# Patient Record
Sex: FEMALE | Race: WHITE | NOT HISPANIC OR LATINO
[De-identification: names, ages, dates, MRNs, and addresses within clinical notes are randomized per-mention and may not be internally consistent; named-entity substitution may affect disease eponyms.]

---

## 2017-01-06 ENCOUNTER — APPOINTMENT (OUTPATIENT)
Dept: RHEUMATOLOGY | Facility: CLINIC | Age: 62
End: 2017-01-06

## 2017-01-19 ENCOUNTER — RX RENEWAL (OUTPATIENT)
Age: 62
End: 2017-01-19

## 2017-02-15 ENCOUNTER — APPOINTMENT (OUTPATIENT)
Dept: RHEUMATOLOGY | Facility: CLINIC | Age: 62
End: 2017-02-15

## 2017-02-15 VITALS — HEART RATE: 88 BPM | SYSTOLIC BLOOD PRESSURE: 138 MMHG | DIASTOLIC BLOOD PRESSURE: 78 MMHG | OXYGEN SATURATION: 97 %

## 2017-02-16 LAB
25(OH)D3 SERPL-MCNC: 33.7 NG/ML
ALBUMIN SERPL ELPH-MCNC: 4.9 G/DL
ALP BLD-CCNC: 55 U/L
ALT SERPL-CCNC: 17 U/L
ANION GAP SERPL CALC-SCNC: 14 MMOL/L
AST SERPL-CCNC: 20 U/L
BASOPHILS # BLD AUTO: 0.04 K/UL
BASOPHILS NFR BLD AUTO: 0.5 %
BILIRUB SERPL-MCNC: 0.3 MG/DL
BUN SERPL-MCNC: 22 MG/DL
C3 SERPL-MCNC: 109 MG/DL
C4 SERPL-MCNC: 20 MG/DL
CALCIUM SERPL-MCNC: 9.8 MG/DL
CHLORIDE SERPL-SCNC: 104 MMOL/L
CO2 SERPL-SCNC: 28 MMOL/L
CREAT SERPL-MCNC: 0.68 MG/DL
CREAT SPEC-SCNC: 208 MG/DL
CREAT/PROT UR: 0.1 RATIO
CRP SERPL-MCNC: <0.2 MG/DL
DSDNA AB SER-ACNC: <12 IU/ML
EOSINOPHIL # BLD AUTO: 0.13 K/UL
EOSINOPHIL NFR BLD AUTO: 1.7 %
ERYTHROCYTE [SEDIMENTATION RATE] IN BLOOD BY WESTERGREN METHOD: 10 MM/HR
GLUCOSE SERPL-MCNC: 94 MG/DL
HCT VFR BLD CALC: 42.6 %
HGB BLD-MCNC: 13.7 G/DL
IMM GRANULOCYTES NFR BLD AUTO: 0.3 %
LYMPHOCYTES # BLD AUTO: 2.57 K/UL
LYMPHOCYTES NFR BLD AUTO: 32.7 %
MAN DIFF?: NORMAL
MCHC RBC-ENTMCNC: 29.7 PG
MCHC RBC-ENTMCNC: 32.2 GM/DL
MCV RBC AUTO: 92.4 FL
MONOCYTES # BLD AUTO: 0.63 K/UL
MONOCYTES NFR BLD AUTO: 8 %
NEUTROPHILS # BLD AUTO: 4.46 K/UL
NEUTROPHILS NFR BLD AUTO: 56.8 %
PLATELET # BLD AUTO: 256 K/UL
POTASSIUM SERPL-SCNC: 3.9 MMOL/L
PROT SERPL-MCNC: 7.4 G/DL
PROT UR-MCNC: 16 MG/DL
RBC # BLD: 4.61 M/UL
RBC # FLD: 13.3 %
SODIUM SERPL-SCNC: 146 MMOL/L
WBC # FLD AUTO: 7.85 K/UL

## 2017-02-17 LAB
ANA PAT FLD IF-IMP: ABNORMAL
ANA SER IF-ACNC: ABNORMAL

## 2017-08-18 ENCOUNTER — APPOINTMENT (OUTPATIENT)
Dept: RHEUMATOLOGY | Facility: CLINIC | Age: 62
End: 2017-08-18

## 2017-08-25 ENCOUNTER — APPOINTMENT (OUTPATIENT)
Dept: RHEUMATOLOGY | Facility: CLINIC | Age: 62
End: 2017-08-25
Payer: COMMERCIAL

## 2017-08-25 VITALS
DIASTOLIC BLOOD PRESSURE: 73 MMHG | HEART RATE: 90 BPM | BODY MASS INDEX: 21.44 KG/M2 | WEIGHT: 121 LBS | SYSTOLIC BLOOD PRESSURE: 121 MMHG | HEIGHT: 63 IN

## 2017-08-25 PROCEDURE — 99213 OFFICE O/P EST LOW 20 MIN: CPT

## 2017-08-25 RX ORDER — NITROFURANTOIN (MONOHYDRATE/MACROCRYSTALS) 25; 75 MG/1; MG/1
100 CAPSULE ORAL
Qty: 14 | Refills: 0 | Status: DISCONTINUED | COMMUNITY
Start: 2017-04-24

## 2017-09-08 LAB
25(OH)D3 SERPL-MCNC: 47.1 NG/ML
ALBUMIN SERPL ELPH-MCNC: 4.9 G/DL
ALP BLD-CCNC: 62 U/L
ALT SERPL-CCNC: 16 U/L
ANA PAT FLD IF-IMP: ABNORMAL
ANA SER IF-ACNC: ABNORMAL
ANION GAP SERPL CALC-SCNC: 16 MMOL/L
AST SERPL-CCNC: 18 U/L
BASOPHILS # BLD AUTO: 0.01 K/UL
BASOPHILS NFR BLD AUTO: 0.2 %
BILIRUB SERPL-MCNC: 0.3 MG/DL
BUN SERPL-MCNC: 27 MG/DL
C3 SERPL-MCNC: 124 MG/DL
C4 SERPL-MCNC: 19 MG/DL
CALCIUM SERPL-MCNC: 10.1 MG/DL
CHLORIDE SERPL-SCNC: 103 MMOL/L
CO2 SERPL-SCNC: 25 MMOL/L
CREAT SERPL-MCNC: 0.8 MG/DL
CREAT SPEC-SCNC: 209 MG/DL
CREAT/PROT UR: 0.1 RATIO
CRP SERPL-MCNC: <0.2 MG/DL
DSDNA AB SER-ACNC: <12 IU/ML
EOSINOPHIL # BLD AUTO: 0.07 K/UL
EOSINOPHIL NFR BLD AUTO: 1.5 %
ERYTHROCYTE [SEDIMENTATION RATE] IN BLOOD BY WESTERGREN METHOD: 14 MM/HR
GLUCOSE SERPL-MCNC: 75 MG/DL
HCT VFR BLD CALC: 43 %
HGB BLD-MCNC: 14.2 G/DL
IMM GRANULOCYTES NFR BLD AUTO: 0.2 %
LYMPHOCYTES # BLD AUTO: 1.85 K/UL
LYMPHOCYTES NFR BLD AUTO: 39.2 %
MAN DIFF?: NORMAL
MCHC RBC-ENTMCNC: 29.6 PG
MCHC RBC-ENTMCNC: 33 GM/DL
MCV RBC AUTO: 89.6 FL
MONOCYTES # BLD AUTO: 0.43 K/UL
MONOCYTES NFR BLD AUTO: 9.1 %
NEUTROPHILS # BLD AUTO: 2.35 K/UL
NEUTROPHILS NFR BLD AUTO: 49.8 %
PLATELET # BLD AUTO: 253 K/UL
POTASSIUM SERPL-SCNC: 5 MMOL/L
PROT SERPL-MCNC: 7.7 G/DL
PROT UR-MCNC: 12 MG/DL
RBC # BLD: 4.8 M/UL
RBC # FLD: 14 %
SODIUM SERPL-SCNC: 144 MMOL/L
WBC # FLD AUTO: 4.72 K/UL

## 2018-03-02 ENCOUNTER — APPOINTMENT (OUTPATIENT)
Dept: RHEUMATOLOGY | Facility: CLINIC | Age: 63
End: 2018-03-02
Payer: COMMERCIAL

## 2018-03-02 VITALS
BODY MASS INDEX: 21.62 KG/M2 | OXYGEN SATURATION: 99 % | HEART RATE: 96 BPM | DIASTOLIC BLOOD PRESSURE: 88 MMHG | WEIGHT: 122 LBS | HEIGHT: 63 IN | SYSTOLIC BLOOD PRESSURE: 146 MMHG

## 2018-03-02 PROCEDURE — 99214 OFFICE O/P EST MOD 30 MIN: CPT

## 2018-03-02 RX ORDER — MUPIROCIN 20 MG/G
2 OINTMENT TOPICAL
Qty: 22 | Refills: 0 | Status: ACTIVE | COMMUNITY
Start: 2017-12-28

## 2018-03-19 ENCOUNTER — RX RENEWAL (OUTPATIENT)
Age: 63
End: 2018-03-19

## 2018-03-19 LAB
25(OH)D3 SERPL-MCNC: 35 NG/ML
ALBUMIN SERPL ELPH-MCNC: 4.5 G/DL
ALP BLD-CCNC: 68 U/L
ALT SERPL-CCNC: 20 U/L
ANA SER IF-ACNC: NEGATIVE
ANION GAP SERPL CALC-SCNC: 13 MMOL/L
AST SERPL-CCNC: 26 U/L
BASOPHILS # BLD AUTO: 0.02 K/UL
BASOPHILS NFR BLD AUTO: 0.4 %
BILIRUB SERPL-MCNC: 0.4 MG/DL
BUN SERPL-MCNC: 25 MG/DL
C3 SERPL-MCNC: 123 MG/DL
C4 SERPL-MCNC: 21 MG/DL
CALCIUM SERPL-MCNC: 9.4 MG/DL
CHLORIDE SERPL-SCNC: 103 MMOL/L
CO2 SERPL-SCNC: 26 MMOL/L
CREAT SERPL-MCNC: 0.74 MG/DL
CREAT SPEC-SCNC: 188 MG/DL
CREAT/PROT UR: 0.1 RATIO
CRP SERPL-MCNC: <0.2 MG/DL
DSDNA AB SER-ACNC: <12 IU/ML
EOSINOPHIL # BLD AUTO: 0.13 K/UL
EOSINOPHIL NFR BLD AUTO: 2.3 %
ERYTHROCYTE [SEDIMENTATION RATE] IN BLOOD BY WESTERGREN METHOD: 16 MM/HR
GLUCOSE SERPL-MCNC: 61 MG/DL
HCT VFR BLD CALC: 43.6 %
HGB BLD-MCNC: 14.7 G/DL
IMM GRANULOCYTES NFR BLD AUTO: 0.2 %
LYMPHOCYTES # BLD AUTO: 1.53 K/UL
LYMPHOCYTES NFR BLD AUTO: 27.1 %
MAN DIFF?: NORMAL
MCHC RBC-ENTMCNC: 29.9 PG
MCHC RBC-ENTMCNC: 33.7 GM/DL
MCV RBC AUTO: 88.6 FL
MONOCYTES # BLD AUTO: 0.42 K/UL
MONOCYTES NFR BLD AUTO: 7.4 %
NEUTROPHILS # BLD AUTO: 3.54 K/UL
NEUTROPHILS NFR BLD AUTO: 62.6 %
PLATELET # BLD AUTO: 251 K/UL
POTASSIUM SERPL-SCNC: 4 MMOL/L
PROT SERPL-MCNC: 8.1 G/DL
PROT UR-MCNC: 11 MG/DL
RBC # BLD: 4.92 M/UL
RBC # FLD: 13.3 %
SODIUM SERPL-SCNC: 142 MMOL/L
WBC # FLD AUTO: 5.65 K/UL

## 2018-05-11 ENCOUNTER — APPOINTMENT (OUTPATIENT)
Dept: RHEUMATOLOGY | Facility: CLINIC | Age: 63
End: 2018-05-11
Payer: COMMERCIAL

## 2018-05-11 PROCEDURE — 96374 THER/PROPH/DIAG INJ IV PUSH: CPT

## 2018-09-07 ENCOUNTER — LABORATORY RESULT (OUTPATIENT)
Age: 63
End: 2018-09-07

## 2018-09-07 ENCOUNTER — APPOINTMENT (OUTPATIENT)
Dept: RHEUMATOLOGY | Facility: CLINIC | Age: 63
End: 2018-09-07
Payer: COMMERCIAL

## 2018-09-07 VITALS
HEART RATE: 90 BPM | DIASTOLIC BLOOD PRESSURE: 75 MMHG | OXYGEN SATURATION: 98 % | SYSTOLIC BLOOD PRESSURE: 118 MMHG | WEIGHT: 125 LBS | HEIGHT: 63 IN | BODY MASS INDEX: 22.15 KG/M2

## 2018-09-07 PROCEDURE — 99214 OFFICE O/P EST MOD 30 MIN: CPT

## 2018-09-07 RX ORDER — HYDROCODONE BITARTRATE AND HOMATROPINE METHYLBROMIDE 5; 1.5 MG/5ML; MG/5ML
5-1.5 SYRUP ORAL
Qty: 240 | Refills: 0 | Status: DISCONTINUED | COMMUNITY
Start: 2018-03-02 | End: 2018-09-07

## 2018-09-11 LAB
25(OH)D3 SERPL-MCNC: 51.3 NG/ML
ALBUMIN MFR SERPL ELPH: 61.6 %
ALBUMIN SERPL ELPH-MCNC: 4.6 G/DL
ALBUMIN SERPL-MCNC: 4.6 G/DL
ALBUMIN/GLOB SERPL: 1.6 RATIO
ALP BLD-CCNC: 57 U/L
ALPHA1 GLOB MFR SERPL ELPH: 4 %
ALPHA1 GLOB SERPL ELPH-MCNC: 0.3 G/DL
ALPHA2 GLOB MFR SERPL ELPH: 10.7 %
ALPHA2 GLOB SERPL ELPH-MCNC: 0.8 G/DL
ALT SERPL-CCNC: 17 U/L
ANION GAP SERPL CALC-SCNC: 13 MMOL/L
APPEARANCE: ABNORMAL
AST SERPL-CCNC: 25 U/L
B-GLOBULIN MFR SERPL ELPH: 10.4 %
B-GLOBULIN SERPL ELPH-MCNC: 0.8 G/DL
BASOPHILS # BLD AUTO: 0.03 K/UL
BASOPHILS NFR BLD AUTO: 0.4 %
BILIRUB SERPL-MCNC: 0.3 MG/DL
BILIRUBIN URINE: NEGATIVE
BLOOD URINE: NEGATIVE
BUN SERPL-MCNC: 23 MG/DL
C3 SERPL-MCNC: 123 MG/DL
C4 SERPL-MCNC: 22 MG/DL
CALCIUM SERPL-MCNC: 9.7 MG/DL
CHLORIDE SERPL-SCNC: 103 MMOL/L
CK BB SERPL ELPH-CCNC: 0 % (ref 0–?)
CK MB CFR SERPL ELPH: 0 %
CK MM SERPL ELPH-CCNC: 100 %
CO2 SERPL-SCNC: 27 MMOL/L
COLOR: YELLOW
CREAT SERPL-MCNC: 0.71 MG/DL
CREAT SPEC-SCNC: 168 MG/DL
CREAT/PROT UR: 0.1 RATIO
CREATINE KINASE,TOTAL,SERUM: 196 U/L
CRP SERPL-MCNC: <0.1 MG/DL
DSDNA AB SER-ACNC: <12 IU/ML
EOSINOPHIL # BLD AUTO: 0.09 K/UL
EOSINOPHIL NFR BLD AUTO: 1.3 %
ERYTHROCYTE [SEDIMENTATION RATE] IN BLOOD BY WESTERGREN METHOD: 13 MM/HR
GAMMA GLOB FLD ELPH-MCNC: 1 G/DL
GAMMA GLOB MFR SERPL ELPH: 13.3 %
GLUCOSE QUALITATIVE U: NEGATIVE MG/DL
GLUCOSE SERPL-MCNC: 98 MG/DL
HCT VFR BLD CALC: 41.7 %
HGB BLD-MCNC: 13.3 G/DL
IMM GRANULOCYTES NFR BLD AUTO: 0.3 %
INTERPRETATION SERPL IEP-IMP: NORMAL
KETONES URINE: NEGATIVE
LEUKOCYTE ESTERASE URINE: NEGATIVE
LYMPHOCYTES # BLD AUTO: 1.92 K/UL
LYMPHOCYTES NFR BLD AUTO: 26.7 %
MACRO TYPE 1: 0 %
MACRO TYPE 2: 0 %
MAN DIFF?: NORMAL
MCHC RBC-ENTMCNC: 29.2 PG
MCHC RBC-ENTMCNC: 31.9 GM/DL
MCV RBC AUTO: 91.6 FL
MONOCYTES # BLD AUTO: 0.56 K/UL
MONOCYTES NFR BLD AUTO: 7.8 %
NEUTROPHILS # BLD AUTO: 4.56 K/UL
NEUTROPHILS NFR BLD AUTO: 63.5 %
NITRITE URINE: NEGATIVE
PH URINE: 5.5
PLATELET # BLD AUTO: 265 K/UL
POTASSIUM SERPL-SCNC: 4.3 MMOL/L
PROT SERPL-MCNC: 7.4 G/DL
PROT SERPL-MCNC: 7.5 G/DL
PROT SERPL-MCNC: 7.5 G/DL
PROT UR-MCNC: 14 MG/DL
PROTEIN URINE: NEGATIVE MG/DL
RBC # BLD: 4.55 M/UL
RBC # FLD: 14.4 %
SODIUM SERPL-SCNC: 143 MMOL/L
SPECIFIC GRAVITY URINE: 1.02
TSH SERPL-ACNC: 2.56 UIU/ML
UROBILINOGEN URINE: NEGATIVE MG/DL
WBC # FLD AUTO: 7.18 K/UL

## 2018-09-12 LAB
ANA PAT FLD IF-IMP: ABNORMAL
ANA SER IF-ACNC: ABNORMAL

## 2018-10-11 ENCOUNTER — RX RENEWAL (OUTPATIENT)
Age: 63
End: 2018-10-11

## 2018-11-14 ENCOUNTER — RX RENEWAL (OUTPATIENT)
Age: 63
End: 2018-11-14

## 2018-11-15 ENCOUNTER — RX RENEWAL (OUTPATIENT)
Age: 63
End: 2018-11-15

## 2019-02-19 ENCOUNTER — RX RENEWAL (OUTPATIENT)
Age: 64
End: 2019-02-19

## 2019-03-12 ENCOUNTER — APPOINTMENT (OUTPATIENT)
Dept: RHEUMATOLOGY | Facility: CLINIC | Age: 64
End: 2019-03-12

## 2019-03-15 ENCOUNTER — APPOINTMENT (OUTPATIENT)
Dept: RHEUMATOLOGY | Facility: CLINIC | Age: 64
End: 2019-03-15

## 2019-03-20 ENCOUNTER — RX RENEWAL (OUTPATIENT)
Age: 64
End: 2019-03-20

## 2019-06-07 ENCOUNTER — APPOINTMENT (OUTPATIENT)
Dept: RHEUMATOLOGY | Facility: CLINIC | Age: 64
End: 2019-06-07
Payer: COMMERCIAL

## 2019-06-07 VITALS
BODY MASS INDEX: 21.97 KG/M2 | SYSTOLIC BLOOD PRESSURE: 123 MMHG | WEIGHT: 124 LBS | OXYGEN SATURATION: 96 % | DIASTOLIC BLOOD PRESSURE: 78 MMHG | HEART RATE: 93 BPM | HEIGHT: 63 IN

## 2019-06-07 PROCEDURE — 99214 OFFICE O/P EST MOD 30 MIN: CPT

## 2019-06-07 NOTE — REVIEW OF SYSTEMS
[Fever] : no fever [Feeling Poorly] : not feeling poorly [Chills] : no chills [Feeling Tired] : not feeling tired [Eye Pain] : no eye pain [Dry Eyes] : no dryness of the eyes [Loss Of Hearing] : no hearing loss [Chest Pain] : no chest pain [Palpitations] : no palpitations [Shortness Of Breath] : no shortness of breath [Cough] : no cough [SOB on Exertion] : no shortness of breath during exertion [Abdominal Pain] : no abdominal pain [Dysuria] : no dysuria [Joint Pain] : no joint pain [Arthralgias] : arthralgias [Joint Swelling] : no joint swelling [Joint Stiffness] : no joint stiffness [Skin Lesions] : no skin lesions [Difficulty Walking] : no difficulty walking [Limb Weakness] : no limb weakness [Anxiety] : no anxiety [Muscle Weakness] : no muscle weakness [Depression] : no depression [Easy Bruising] : no tendency for easy bruising

## 2019-06-07 NOTE — HISTORY OF PRESENT ILLNESS
[FreeTextEntry1] : 63 year old female well known to me for many years. She has a diagnosis of SLE, osteopenia and hand OA \par as far as her lupus is concerned she was diagnosed prior to seeing me, she has had minor symptoms mostly with positive serologies and negative APL antibodies. She has been doing well and her SLE has been quiescent for the past few years and I have been able to wean her down on the plaquenil from 500 mg to now off. \par She denies alopecia, oral ulcers, sicca symptoms, or Raynauds. Her joint pains have been stable, she rates it at a 1/10, with some fatigue. She continues to work.\par She has osteopenia and has been reclast q 2 years. \par \par She has a good appetite and denies wt loss. She denies fevers, chills or night sweats. She is uptodate on her age appropriate screens.

## 2019-06-07 NOTE — ASSESSMENT
[FreeTextEntry1] : 63 year old female well known to me for many years. She has a diagnosis of SLE, osteopenia and hand OA presents for routine management today. \par \par \par SLE-\par  she was diagnosed prior to seeing me, she has had minor symptoms mostly with positive serologies and negative APL antibodies. She has been doing well and her SLE has been quiescent for the past few years. She is now off PLQ for past year and doing well. \par Her review of systems is otherwise negative LAbs to be done today. \par \par Muscle spasm/ secondary FMS-\par \par To continue amitrytiline at bed time\par \par Osteopenia-\par she had osteoporosis in the past. She was on reclast and received it 2 years ago at a q 2 year dose. She is to use calcium and vitamin D at 1200 mg qd.\par \par Hand OA-\par She has underlying OA and has had steroid injections in her 5th PIP in the past on the right hand. \par \par Muscle cramps-\par She complains of muscle cramps mostly in her calf muscles, she has had restless leg syndrome in the past and it does not feel like that. Today on examination there is no tenderness or swelling. She states that she drinks enough water. She is to try tizanidine at bedtime. \par \par \par She is aware to call if her symptoms worsen. f/u 6 months. \par

## 2019-06-07 NOTE — PHYSICAL EXAM
[General Appearance - Well Nourished] : well nourished [General Appearance - Alert] : alert [Sclera] : the sclera and conjunctiva were normal [General Appearance - Well Developed] : well developed [Oropharynx] : the oropharynx was normal [Thyroid Diffuse Enlargement] : the thyroid was not enlarged [Neck Appearance] : the appearance of the neck was normal [Auscultation Breath Sounds / Voice Sounds] : lungs were clear to auscultation bilaterally [Respiration, Rhythm And Depth] : normal respiratory rhythm and effort [Heart Sounds] : normal S1 and S2 [Murmurs] : no murmurs [Full Pulse] : the pedal pulses are present [Edema] : there was no peripheral edema [Abdomen Tenderness] : non-tender [Cervical Lymph Nodes Enlarged Anterior Bilaterally] : anterior cervical [Supraclavicular Lymph Nodes Enlarged Bilaterally] : supraclavicular [No CVA Tenderness] : no ~M costovertebral angle tenderness [No Spinal Tenderness] : no spinal tenderness [Abnormal Walk] : normal gait [Nail Clubbing] : no clubbing  or cyanosis of the fingernails [Musculoskeletal - Swelling] : no joint swelling seen [Motor Tone] : muscle strength and tone were normal [] : no rash [FreeTextEntry1] : FROM all joints, no tender points noted, hands with OA changes, with Heberdens nodes  [Deep Tendon Reflexes (DTR)] : deep tendon reflexes were 2+ and symmetric [Oriented To Time, Place, And Person] : oriented to person, place, and time [Motor Exam] : the motor exam was normal [Impaired Insight] : insight and judgment were intact [Affect] : the affect was normal

## 2019-11-13 ENCOUNTER — RX RENEWAL (OUTPATIENT)
Age: 64
End: 2019-11-13

## 2019-11-17 ENCOUNTER — TRANSCRIPTION ENCOUNTER (OUTPATIENT)
Age: 64
End: 2019-11-17

## 2019-12-04 ENCOUNTER — RX RENEWAL (OUTPATIENT)
Age: 64
End: 2019-12-04

## 2019-12-04 LAB
ALBUMIN SERPL ELPH-MCNC: 4 G/DL
ALP BLD-CCNC: 75 U/L
ALT SERPL-CCNC: 13 U/L
ANA SER IF-ACNC: NEGATIVE
ANION GAP SERPL CALC-SCNC: 14 MMOL/L
AST SERPL-CCNC: 15 U/L
BASOPHILS # BLD AUTO: 0.03 K/UL
BASOPHILS NFR BLD AUTO: 0.4 %
BILIRUB SERPL-MCNC: 0.2 MG/DL
BUN SERPL-MCNC: 23 MG/DL
C3 SERPL-MCNC: 161 MG/DL
C4 SERPL-MCNC: 28 MG/DL
CALCIUM SERPL-MCNC: 9.5 MG/DL
CHLORIDE SERPL-SCNC: 106 MMOL/L
CO2 SERPL-SCNC: 26 MMOL/L
CREAT SERPL-MCNC: 0.76 MG/DL
CRP SERPL-MCNC: 4.58 MG/DL
DSDNA AB SER-ACNC: <12 IU/ML
EOSINOPHIL # BLD AUTO: 0.2 K/UL
EOSINOPHIL NFR BLD AUTO: 2.6 %
ERYTHROCYTE [SEDIMENTATION RATE] IN BLOOD BY WESTERGREN METHOD: 50 MM/HR
GLUCOSE SERPL-MCNC: 71 MG/DL
HCT VFR BLD CALC: 38.1 %
HGB BLD-MCNC: 12.1 G/DL
IMM GRANULOCYTES NFR BLD AUTO: 0.3 %
LYMPHOCYTES # BLD AUTO: 1.7 K/UL
LYMPHOCYTES NFR BLD AUTO: 21.7 %
MAN DIFF?: NORMAL
MCHC RBC-ENTMCNC: 30.1 PG
MCHC RBC-ENTMCNC: 31.8 GM/DL
MCV RBC AUTO: 94.8 FL
MONOCYTES # BLD AUTO: 0.73 K/UL
MONOCYTES NFR BLD AUTO: 9.3 %
NEUTROPHILS # BLD AUTO: 5.15 K/UL
NEUTROPHILS NFR BLD AUTO: 65.7 %
PLATELET # BLD AUTO: 246 K/UL
POTASSIUM SERPL-SCNC: 4.1 MMOL/L
PROT SERPL-MCNC: 6.6 G/DL
RBC # BLD: 4.02 M/UL
RBC # FLD: 13.3 %
SODIUM SERPL-SCNC: 146 MMOL/L
WBC # FLD AUTO: 7.83 K/UL

## 2019-12-09 ENCOUNTER — FORM ENCOUNTER (OUTPATIENT)
Age: 64
End: 2019-12-09

## 2019-12-10 ENCOUNTER — OUTPATIENT (OUTPATIENT)
Dept: OUTPATIENT SERVICES | Facility: HOSPITAL | Age: 64
LOS: 1 days | End: 2019-12-10
Payer: COMMERCIAL

## 2019-12-10 ENCOUNTER — APPOINTMENT (OUTPATIENT)
Dept: RADIOLOGY | Facility: CLINIC | Age: 64
End: 2019-12-10
Payer: COMMERCIAL

## 2019-12-10 DIAGNOSIS — M32.9 SYSTEMIC LUPUS ERYTHEMATOSUS, UNSPECIFIED: ICD-10-CM

## 2019-12-10 PROCEDURE — 71046 X-RAY EXAM CHEST 2 VIEWS: CPT | Mod: 26

## 2019-12-10 PROCEDURE — 71046 X-RAY EXAM CHEST 2 VIEWS: CPT

## 2019-12-11 ENCOUNTER — APPOINTMENT (OUTPATIENT)
Dept: RHEUMATOLOGY | Facility: CLINIC | Age: 64
End: 2019-12-11
Payer: COMMERCIAL

## 2019-12-11 VITALS
DIASTOLIC BLOOD PRESSURE: 75 MMHG | BODY MASS INDEX: 22.14 KG/M2 | WEIGHT: 125 LBS | HEART RATE: 97 BPM | SYSTOLIC BLOOD PRESSURE: 130 MMHG | TEMPERATURE: 98.6 F | OXYGEN SATURATION: 97 %

## 2019-12-11 PROCEDURE — 99213 OFFICE O/P EST LOW 20 MIN: CPT

## 2019-12-11 RX ORDER — FLUTICASONE PROPIONATE 50 UG/1
50 SPRAY, METERED NASAL
Qty: 3 | Refills: 3 | Status: ACTIVE | COMMUNITY
Start: 2019-12-11 | End: 1900-01-01

## 2019-12-11 RX ORDER — ONABOTULINUMTOXINA 100 [USP'U]/1
100 INJECTION, POWDER, LYOPHILIZED, FOR SOLUTION INTRADERMAL; INTRAMUSCULAR
Qty: 1 | Refills: 0 | Status: ACTIVE | COMMUNITY
Start: 2019-02-04

## 2019-12-11 NOTE — ASSESSMENT
[FreeTextEntry1] : Patient comes in today for further evaluation of chronic cough for the last 5 weeks. She has not responded to antibiotics, steroid pack, codeine cough syrup. She denies fevers, phlegm, sinus pain, ear pain. I had sent her for a chest x-ray which was normal. Her labs reveal her lupus to be quiet and although her sedimentation rate was mildly elevated.\par \par On exam her lungs are clear to auscultation, her ears are normal on exam, there is no sinus tenderness and there is no postnasal drip.\par \par Discussed with her that this could be chronic GERD symptoms\par \par Plan-\par -to use Flonase\par -2 use Allegra\par -2 use Protonix q.d.\par -If symptoms persist she is aware to notify me after 2 weeks\par -She is asking for a new primary care physician, numbers provided

## 2019-12-11 NOTE — HISTORY OF PRESENT ILLNESS
[FreeTextEntry1] : She comes in acutely. I have spoken to her on several occasions now, she has been dealing with a chronic cough for the last 5 weeks. She was treated with doxycycline initially followed by Medrol Dosepak followed by codeine cough syrup with minimal improvement. I spoke with her I called in prednisone for her and it did not help. She denies GERD symptoms. I sent her for a chest x-ray which was normal her sedimentation rate was mildly elevated.\par She denies phlegm. She denies sinus pain. She denies fullness or pain in ears. She denies fevers or night sweats.

## 2019-12-11 NOTE — PHYSICAL EXAM
[General Appearance - Well Developed] : well developed [General Appearance - Well Nourished] : well nourished [General Appearance - Alert] : alert [Nasal Cavity] : the nasal mucosa and septum were normal [Oropharynx] : the oropharynx was normal [Outer Ear] : the ears and nose were normal in appearance [Auscultation Breath Sounds / Voice Sounds] : lungs were clear to auscultation bilaterally [Respiration, Rhythm And Depth] : normal respiratory rhythm and effort

## 2020-02-25 LAB
25(OH)D3 SERPL-MCNC: 32.4 NG/ML
ALBUMIN SERPL ELPH-MCNC: 4.8 G/DL
ALP BLD-CCNC: 73 U/L
ALT SERPL-CCNC: 15 U/L
ANION GAP SERPL CALC-SCNC: 16 MMOL/L
AST SERPL-CCNC: 21 U/L
BASOPHILS # BLD AUTO: 0.04 K/UL
BASOPHILS NFR BLD AUTO: 0.6 %
BILIRUB SERPL-MCNC: 0.3 MG/DL
BUN SERPL-MCNC: 25 MG/DL
C3 SERPL-MCNC: 133 MG/DL
C4 SERPL-MCNC: 20 MG/DL
CALCIUM SERPL-MCNC: 10.1 MG/DL
CHLORIDE SERPL-SCNC: 105 MMOL/L
CO2 SERPL-SCNC: 22 MMOL/L
CREAT SERPL-MCNC: 0.82 MG/DL
CRP SERPL-MCNC: <0.1 MG/DL
EOSINOPHIL # BLD AUTO: 0.11 K/UL
EOSINOPHIL NFR BLD AUTO: 1.7 %
ERYTHROCYTE [SEDIMENTATION RATE] IN BLOOD BY WESTERGREN METHOD: 22 MM/HR
GLUCOSE SERPL-MCNC: 87 MG/DL
HCT VFR BLD CALC: 43.6 %
HGB BLD-MCNC: 13.6 G/DL
IMM GRANULOCYTES NFR BLD AUTO: 0.3 %
LYMPHOCYTES # BLD AUTO: 1.85 K/UL
LYMPHOCYTES NFR BLD AUTO: 29 %
MAN DIFF?: NORMAL
MCHC RBC-ENTMCNC: 30 PG
MCHC RBC-ENTMCNC: 31.2 GM/DL
MCV RBC AUTO: 96 FL
MONOCYTES # BLD AUTO: 0.41 K/UL
MONOCYTES NFR BLD AUTO: 6.4 %
NEUTROPHILS # BLD AUTO: 3.96 K/UL
NEUTROPHILS NFR BLD AUTO: 62 %
PLATELET # BLD AUTO: 260 K/UL
POTASSIUM SERPL-SCNC: 4.2 MMOL/L
PROT SERPL-MCNC: 7.2 G/DL
RBC # BLD: 4.54 M/UL
RBC # FLD: 13.6 %
SODIUM SERPL-SCNC: 144 MMOL/L
WBC # FLD AUTO: 6.39 K/UL

## 2020-02-26 LAB — DSDNA AB SER-ACNC: <12 IU/ML

## 2020-02-27 LAB
ANA PAT FLD IF-IMP: ABNORMAL
ANA SER IF-ACNC: ABNORMAL

## 2020-03-06 ENCOUNTER — APPOINTMENT (OUTPATIENT)
Dept: RHEUMATOLOGY | Facility: CLINIC | Age: 65
End: 2020-03-06
Payer: COMMERCIAL

## 2020-03-06 VITALS
SYSTOLIC BLOOD PRESSURE: 120 MMHG | DIASTOLIC BLOOD PRESSURE: 80 MMHG | BODY MASS INDEX: 22.5 KG/M2 | HEIGHT: 63 IN | TEMPERATURE: 98.1 F | OXYGEN SATURATION: 97 % | HEART RATE: 83 BPM | WEIGHT: 127 LBS

## 2020-03-06 PROCEDURE — 99214 OFFICE O/P EST MOD 30 MIN: CPT

## 2020-03-06 RX ORDER — METOPROLOL TARTRATE 25 MG/1
25 TABLET, FILM COATED ORAL
Qty: 10 | Refills: 0 | Status: DISCONTINUED | COMMUNITY
Start: 2019-07-18 | End: 2020-03-06

## 2020-03-06 RX ORDER — PREDNISONE 5 MG/1
5 TABLET ORAL
Qty: 40 | Refills: 0 | Status: DISCONTINUED | COMMUNITY
Start: 2019-12-04 | End: 2020-03-06

## 2020-03-06 RX ORDER — METHYLPREDNISOLONE 4 MG/1
4 TABLET ORAL
Qty: 21 | Refills: 0 | Status: DISCONTINUED | COMMUNITY
Start: 2019-11-17 | End: 2020-03-06

## 2020-03-06 RX ORDER — DOXYCYCLINE 100 MG/1
100 CAPSULE ORAL
Qty: 10 | Refills: 0 | Status: DISCONTINUED | COMMUNITY
Start: 2019-11-17 | End: 2020-03-06

## 2020-03-06 RX ORDER — PANTOPRAZOLE 40 MG/1
40 TABLET, DELAYED RELEASE ORAL
Qty: 30 | Refills: 5 | Status: DISCONTINUED | COMMUNITY
Start: 2019-12-11 | End: 2020-03-06

## 2020-03-06 NOTE — ASSESSMENT
[FreeTextEntry1] : 64 year old female well known to me for many years. She has a diagnosis of SLE, osteopenia and hand OA presents for routine management today. \par \par \par SLE-\par  she was diagnosed prior to seeing me, she has had minor symptoms mostly with positive serologies and negative APL antibodies. She has been doing well and her SLE has been quiescent for the past few years. She is now off PLQ for past year and doing well. \par Her review of systems is otherwise negative , recent labs her double-stranded DNA is negative\par \par Muscle spasm/ secondary FMS-\par \par To continue amitrytiline at bed time\par \par Osteopenia-\par she had osteoporosis in the past. She was on reclast and received it 2 years ago at a q 2 year dose. She is to use calcium and vitamin D at 1200 mg qd.Repeat bone density test in May 2020\par \par Hand OA-\par She has underlying OA and has had steroid injections in her 5th PIP in the past on the right hand. \par \par Cough-\par -To return to allergy and get tested\par \par \par She is aware to call if her symptoms worsen. f/u 6 months. \par

## 2020-03-06 NOTE — HISTORY OF PRESENT ILLNESS
[FreeTextEntry1] : 64 year old female well known to me for many years. She has a diagnosis of SLE, osteopenia and hand OA \par as far as her lupus is concerned she was diagnosed prior to seeing me, she has had minor symptoms mostly with positive serologies and negative APL antibodies. She has been doing well and her SLE has been quiescent for the past few years and I have been able to wean her down on the plaquenil from 500 mg to now off. \par She denies alopecia, oral ulcers, sicca symptoms, or Raynauds. Her joint pains have been stable, she rates it at a 1/10, with some fatigue. She continues to work.\par She has osteopenia and has been reclast q 2 years. \par The issue lately has a been a cough and congestion. I saw her in December with it. He tried a PPI, did not help, she has been seeing allergy, she states that she had used high dose steroids which made it better. She is using several inhalers as well as Allegra.\par She has a good appetite and denies wt loss. She denies fevers, chills or night sweats. She is uptodate on her age appropriate screens.

## 2020-03-06 NOTE — PHYSICAL EXAM
[General Appearance - Alert] : alert [General Appearance - Well Nourished] : well nourished [General Appearance - Well Developed] : well developed [Sclera] : the sclera and conjunctiva were normal [Oropharynx] : the oropharynx was normal [Neck Appearance] : the appearance of the neck was normal [Thyroid Diffuse Enlargement] : the thyroid was not enlarged [Respiration, Rhythm And Depth] : normal respiratory rhythm and effort [Auscultation Breath Sounds / Voice Sounds] : lungs were clear to auscultation bilaterally [Heart Sounds] : normal S1 and S2 [Murmurs] : no murmurs [Full Pulse] : the pedal pulses are present [Edema] : there was no peripheral edema [Abdomen Tenderness] : non-tender [Cervical Lymph Nodes Enlarged Anterior Bilaterally] : anterior cervical [Supraclavicular Lymph Nodes Enlarged Bilaterally] : supraclavicular [No CVA Tenderness] : no ~M costovertebral angle tenderness [No Spinal Tenderness] : no spinal tenderness [Abnormal Walk] : normal gait [Nail Clubbing] : no clubbing  or cyanosis of the fingernails [Musculoskeletal - Swelling] : no joint swelling seen [Motor Tone] : muscle strength and tone were normal [] : no rash [Deep Tendon Reflexes (DTR)] : deep tendon reflexes were 2+ and symmetric [Motor Exam] : the motor exam was normal [Oriented To Time, Place, And Person] : oriented to person, place, and time [Impaired Insight] : insight and judgment were intact [Affect] : the affect was normal [FreeTextEntry1] : FROM all joints, no tender points noted, hands with OA changes, with Heberdens nodes

## 2020-03-06 NOTE — REVIEW OF SYSTEMS
[Arthralgias] : arthralgias [Fever] : no fever [Chills] : no chills [Feeling Poorly] : not feeling poorly [Feeling Tired] : not feeling tired [Eye Pain] : no eye pain [Dry Eyes] : no dryness of the eyes [Loss Of Hearing] : no hearing loss [Chest Pain] : no chest pain [Palpitations] : no palpitations [Shortness Of Breath] : no shortness of breath [Cough] : no cough [SOB on Exertion] : no shortness of breath during exertion [Abdominal Pain] : no abdominal pain [Dysuria] : no dysuria [Joint Pain] : no joint pain [Joint Swelling] : no joint swelling [Joint Stiffness] : no joint stiffness [Skin Lesions] : no skin lesions [Limb Weakness] : no limb weakness [Difficulty Walking] : no difficulty walking [Anxiety] : no anxiety [Depression] : no depression [Muscle Weakness] : no muscle weakness [Easy Bruising] : no tendency for easy bruising

## 2020-05-11 ENCOUNTER — TRANSCRIPTION ENCOUNTER (OUTPATIENT)
Age: 65
End: 2020-05-11

## 2020-07-17 ENCOUNTER — APPOINTMENT (OUTPATIENT)
Dept: RHEUMATOLOGY | Facility: CLINIC | Age: 65
End: 2020-07-17
Payer: COMMERCIAL

## 2020-07-17 VITALS
SYSTOLIC BLOOD PRESSURE: 120 MMHG | HEIGHT: 63 IN | WEIGHT: 126 LBS | OXYGEN SATURATION: 98 % | TEMPERATURE: 97.2 F | DIASTOLIC BLOOD PRESSURE: 90 MMHG | HEART RATE: 65 BPM | BODY MASS INDEX: 22.32 KG/M2

## 2020-07-17 DIAGNOSIS — M62.838 OTHER MUSCLE SPASM: ICD-10-CM

## 2020-07-17 PROCEDURE — 99214 OFFICE O/P EST MOD 30 MIN: CPT

## 2020-07-17 RX ORDER — FEXOFENADINE HCL 60 MG/1
60 TABLET, FILM COATED ORAL DAILY
Qty: 30 | Refills: 1 | Status: DISCONTINUED | COMMUNITY
Start: 2019-12-11 | End: 2020-07-17

## 2020-07-17 NOTE — PHYSICAL EXAM
[General Appearance - Alert] : alert [General Appearance - Well Developed] : well developed [Sclera] : the sclera and conjunctiva were normal [General Appearance - Well Nourished] : well nourished [Oropharynx] : the oropharynx was normal [Thyroid Diffuse Enlargement] : the thyroid was not enlarged [Neck Appearance] : the appearance of the neck was normal [Respiration, Rhythm And Depth] : normal respiratory rhythm and effort [Auscultation Breath Sounds / Voice Sounds] : lungs were clear to auscultation bilaterally [Full Pulse] : the pedal pulses are present [Murmurs] : no murmurs [Heart Sounds] : normal S1 and S2 [Abdomen Tenderness] : non-tender [Edema] : there was no peripheral edema [Cervical Lymph Nodes Enlarged Anterior Bilaterally] : anterior cervical [Supraclavicular Lymph Nodes Enlarged Bilaterally] : supraclavicular [No Spinal Tenderness] : no spinal tenderness [No CVA Tenderness] : no ~M costovertebral angle tenderness [Abnormal Walk] : normal gait [Nail Clubbing] : no clubbing  or cyanosis of the fingernails [FreeTextEntry1] : FROM all joints, no tender points noted, hands with OA changes, with Heberdens nodes  [Motor Tone] : muscle strength and tone were normal [Musculoskeletal - Swelling] : no joint swelling seen [Deep Tendon Reflexes (DTR)] : deep tendon reflexes were 2+ and symmetric [Motor Exam] : the motor exam was normal [] : no rash [Oriented To Time, Place, And Person] : oriented to person, place, and time [Impaired Insight] : insight and judgment were intact [Affect] : the affect was normal

## 2020-07-17 NOTE — HISTORY OF PRESENT ILLNESS
[FreeTextEntry1] : 64 year old female well known to me for many years. She has a diagnosis of SLE, osteopenia and hand OA \par as far as her lupus is concerned she was diagnosed prior to seeing me, she has had minor symptoms mostly with positive serologies and negative APL antibodies. She has been doing well and her SLE has been quiescent for the past few years and I have been able to wean her down on the plaquenil from 500 mg to now off. \par She denies alopecia, oral ulcers, sicca symptoms, or Raynauds. Her joint pains have been stable, she rates it at a 1/10, with some fatigue. She continues to work.\par She has osteopenia and has been reclast q 2 years. \par The issue lately has a been a cough and congestion. I saw her in December with it. She tried a PPI, did not help, She is still sx and has seen pul and has a CT chest scheduled although was told it may be upper airway issue. \par She has a good appetite and denies wt loss. She denies fevers, chills or night sweats. She is uptodate on her age appropriate screens.

## 2020-07-17 NOTE — REVIEW OF SYSTEMS
[Chills] : no chills [Fever] : no fever [Eye Pain] : no eye pain [Feeling Poorly] : not feeling poorly [Feeling Tired] : not feeling tired [Loss Of Hearing] : no hearing loss [Dry Eyes] : no dryness of the eyes [Shortness Of Breath] : no shortness of breath [Chest Pain] : no chest pain [Palpitations] : no palpitations [Cough] : no cough [SOB on Exertion] : no shortness of breath during exertion [Arthralgias] : arthralgias [Abdominal Pain] : no abdominal pain [Dysuria] : no dysuria [Joint Swelling] : no joint swelling [Joint Pain] : no joint pain [Joint Stiffness] : no joint stiffness [Limb Weakness] : no limb weakness [Skin Lesions] : no skin lesions [Anxiety] : no anxiety [Difficulty Walking] : no difficulty walking [Depression] : no depression [Muscle Weakness] : no muscle weakness [Easy Bruising] : no tendency for easy bruising

## 2020-07-17 NOTE — ASSESSMENT
[FreeTextEntry1] : 64 year old female well known to me for many years. She has a diagnosis of SLE, osteopenia and hand OA presents for routine management today. \par \par \par SLE-\par  she was diagnosed prior to seeing me, she has had minor symptoms mostly with positive serologies and negative APL antibodies. She has been doing well and her SLE has been quiescent for the past few years. She is now off PLQ for past year and doing well. \par Her review of systems is otherwise negative , recent labs her double-stranded DNA is negative\par To repeat labs\par \par Muscle spasm/ secondary FMS-\par \par To continue amitrytiline at bed time\par \par Osteopenia-\par she had osteoporosis in the past. She was on reclast and received it 2 years ago at a q 2 year dose. She is to use calcium and vitamin D at 1200 mg qd.Repeat bone density test \par \par Hand OA-\par She has underlying OA and has had steroid injections in her 5th PIP in the past on the right hand. \par \par Cough-\par -await Ct chest\par \par \par She is aware to call if her symptoms worsen. f/u 3 months. \par

## 2020-07-23 ENCOUNTER — LABORATORY RESULT (OUTPATIENT)
Age: 65
End: 2020-07-23

## 2020-07-27 LAB
25(OH)D3 SERPL-MCNC: 35.7 NG/ML
ALBUMIN SERPL ELPH-MCNC: 4.9 G/DL
ALP BLD-CCNC: 89 U/L
ALT SERPL-CCNC: 18 U/L
ANA SER IF-ACNC: NEGATIVE
ANION GAP SERPL CALC-SCNC: 12 MMOL/L
APPEARANCE: CLEAR
AST SERPL-CCNC: 19 U/L
BASOPHILS # BLD AUTO: 0.03 K/UL
BASOPHILS NFR BLD AUTO: 0.4 %
BILIRUB SERPL-MCNC: 0.3 MG/DL
BILIRUBIN URINE: NEGATIVE
BLOOD URINE: NEGATIVE
BUN SERPL-MCNC: 23 MG/DL
C3 SERPL-MCNC: 121 MG/DL
C4 SERPL-MCNC: 24 MG/DL
CALCIUM SERPL-MCNC: 9.9 MG/DL
CHLORIDE SERPL-SCNC: 104 MMOL/L
CO2 SERPL-SCNC: 27 MMOL/L
COLOR: NORMAL
CREAT SERPL-MCNC: 0.81 MG/DL
CREAT SPEC-SCNC: 130 MG/DL
CREAT/PROT UR: 0.1 RATIO
CRP SERPL-MCNC: 0.12 MG/DL
DSDNA AB SER-ACNC: <12 IU/ML
EOSINOPHIL # BLD AUTO: 0.08 K/UL
EOSINOPHIL NFR BLD AUTO: 1.2 %
ERYTHROCYTE [SEDIMENTATION RATE] IN BLOOD BY WESTERGREN METHOD: 21 MM/HR
GLUCOSE QUALITATIVE U: NEGATIVE
GLUCOSE SERPL-MCNC: 83 MG/DL
HCT VFR BLD CALC: 45.4 %
HGB BLD-MCNC: 14.4 G/DL
IMM GRANULOCYTES NFR BLD AUTO: 0.3 %
KETONES URINE: NEGATIVE
LEUKOCYTE ESTERASE URINE: NEGATIVE
LYMPHOCYTES # BLD AUTO: 1.76 K/UL
LYMPHOCYTES NFR BLD AUTO: 26.1 %
MAN DIFF?: NORMAL
MCHC RBC-ENTMCNC: 30.6 PG
MCHC RBC-ENTMCNC: 31.7 GM/DL
MCV RBC AUTO: 96.4 FL
MONOCYTES # BLD AUTO: 0.52 K/UL
MONOCYTES NFR BLD AUTO: 7.7 %
MPO AB + PR3 PNL SER: NORMAL
NEUTROPHILS # BLD AUTO: 4.33 K/UL
NEUTROPHILS NFR BLD AUTO: 64.3 %
NITRITE URINE: NEGATIVE
PH URINE: 6
PLATELET # BLD AUTO: 232 K/UL
POTASSIUM SERPL-SCNC: 4.7 MMOL/L
PROT SERPL-MCNC: 7.2 G/DL
PROT UR-MCNC: 8 MG/DL
PROTEIN URINE: NORMAL
RBC # BLD: 4.71 M/UL
RBC # FLD: 14.2 %
SODIUM SERPL-SCNC: 143 MMOL/L
SPECIFIC GRAVITY URINE: 1.02
UROBILINOGEN URINE: NORMAL
WBC # FLD AUTO: 6.74 K/UL

## 2020-07-29 LAB
MYELOPEROXIDASE AB SER QL IA: <5 UNITS
MYELOPEROXIDASE CELLS FLD QL: NEGATIVE
PROTEINASE3 AB SER IA-ACNC: <5 UNITS
PROTEINASE3 AB SER-ACNC: NEGATIVE

## 2020-10-23 ENCOUNTER — APPOINTMENT (OUTPATIENT)
Dept: RHEUMATOLOGY | Facility: CLINIC | Age: 65
End: 2020-10-23

## 2020-10-28 ENCOUNTER — APPOINTMENT (OUTPATIENT)
Dept: RHEUMATOLOGY | Facility: CLINIC | Age: 65
End: 2020-10-28
Payer: COMMERCIAL

## 2020-10-28 VITALS
OXYGEN SATURATION: 97 % | WEIGHT: 127 LBS | SYSTOLIC BLOOD PRESSURE: 128 MMHG | HEART RATE: 67 BPM | DIASTOLIC BLOOD PRESSURE: 72 MMHG | TEMPERATURE: 98.1 F | BODY MASS INDEX: 22.5 KG/M2

## 2020-10-28 DIAGNOSIS — M85.80 OTHER SPECIFIED DISORDERS OF BONE DENSITY AND STRUCTURE, UNSPECIFIED SITE: ICD-10-CM

## 2020-10-28 PROCEDURE — 99214 OFFICE O/P EST MOD 30 MIN: CPT | Mod: 25

## 2020-10-28 PROCEDURE — 99072 ADDL SUPL MATRL&STAF TM PHE: CPT

## 2020-10-28 NOTE — HISTORY OF PRESENT ILLNESS
[FreeTextEntry1] : f/u visit\par 65 year old female well known to me for many years. She has a diagnosis of SLE, osteopenia and hand OA \par as far as her lupus is concerned she was diagnosed prior to seeing me, she has had minor symptoms mostly with positive serologies and negative APL antibodies. She has been doing well and her SLE has been quiescent for the past few years and I have been able to wean her down on the plaquenil from 500 mg to now off. She has done  very well without PLq.\par She denies alopecia, oral ulcers, sicca symptoms, or Raynauds. Her joint pains have been stable, she rates it at a 1/10, with some fatigue. She continues to work.\par She has osteopenia and has been reclast q 2 years. \par  she had a CT chest that as per pt showed spots of mucus in her lungs. she was given a ? incentive spirometer by pul, states that she had no change for 2 months. She also saw allergy, and was treated with abx and is better now\par She has a good appetite and denies wt loss. She denies fevers, chills or night sweats. She is uptodate on her age appropriate screens.

## 2021-01-21 ENCOUNTER — OUTPATIENT (OUTPATIENT)
Dept: OUTPATIENT SERVICES | Facility: HOSPITAL | Age: 66
LOS: 1 days | End: 2021-01-21
Payer: COMMERCIAL

## 2021-01-21 ENCOUNTER — APPOINTMENT (OUTPATIENT)
Dept: CT IMAGING | Facility: CLINIC | Age: 66
End: 2021-01-21
Payer: COMMERCIAL

## 2021-01-21 ENCOUNTER — LABORATORY RESULT (OUTPATIENT)
Age: 66
End: 2021-01-21

## 2021-01-21 DIAGNOSIS — R05 COUGH: ICD-10-CM

## 2021-01-21 PROCEDURE — 71250 CT THORAX DX C-: CPT

## 2021-01-21 PROCEDURE — 71250 CT THORAX DX C-: CPT | Mod: 26

## 2021-01-25 ENCOUNTER — NON-APPOINTMENT (OUTPATIENT)
Age: 66
End: 2021-01-25

## 2021-01-25 LAB
25(OH)D3 SERPL-MCNC: 32 NG/ML
ALBUMIN SERPL ELPH-MCNC: 4.7 G/DL
ALP BLD-CCNC: 85 U/L
ALT SERPL-CCNC: 17 U/L
ANA SER IF-ACNC: NEGATIVE
ANION GAP SERPL CALC-SCNC: 13 MMOL/L
AST SERPL-CCNC: 18 U/L
BASOPHILS # BLD AUTO: 0.02 K/UL
BASOPHILS NFR BLD AUTO: 0.4 %
BILIRUB SERPL-MCNC: 0.3 MG/DL
BUN SERPL-MCNC: 20 MG/DL
C3 SERPL-MCNC: 116 MG/DL
C4 SERPL-MCNC: 24 MG/DL
CALCIUM SERPL-MCNC: 9.7 MG/DL
CENTROMERE IGG SER-ACNC: <0.2 CD:130001892
CHLORIDE SERPL-SCNC: 106 MMOL/L
CO2 SERPL-SCNC: 26 MMOL/L
CREAT SERPL-MCNC: 0.85 MG/DL
CREAT SPEC-SCNC: 131 MG/DL
CREAT/PROT UR: 0.1 RATIO
CRP SERPL-MCNC: 0.15 MG/DL
DSDNA AB SER-ACNC: <12 IU/ML
ENA SS-A AB SER IA-ACNC: 0.6 AL
ENA SS-B AB SER IA-ACNC: 0.5 AL
EOSINOPHIL # BLD AUTO: 0.09 K/UL
EOSINOPHIL NFR BLD AUTO: 1.7 %
ERYTHROCYTE [SEDIMENTATION RATE] IN BLOOD BY WESTERGREN METHOD: 13 MM/HR
GLUCOSE SERPL-MCNC: 74 MG/DL
HCT VFR BLD CALC: 43.2 %
HGB BLD-MCNC: 13.8 G/DL
IMM GRANULOCYTES NFR BLD AUTO: 0.2 %
LYMPHOCYTES # BLD AUTO: 1.42 K/UL
LYMPHOCYTES NFR BLD AUTO: 26.4 %
MAN DIFF?: NORMAL
MCHC RBC-ENTMCNC: 30.5 PG
MCHC RBC-ENTMCNC: 31.9 GM/DL
MCV RBC AUTO: 95.4 FL
MONOCYTES # BLD AUTO: 0.51 K/UL
MONOCYTES NFR BLD AUTO: 9.5 %
MPO AB + PR3 PNL SER: NORMAL
NEUTROPHILS # BLD AUTO: 3.33 K/UL
NEUTROPHILS NFR BLD AUTO: 61.8 %
PLATELET # BLD AUTO: 232 K/UL
POTASSIUM SERPL-SCNC: 4.1 MMOL/L
PROT SERPL-MCNC: 7.1 G/DL
PROT UR-MCNC: 9 MG/DL
RBC # BLD: 4.53 M/UL
RBC # FLD: 13.3 %
SODIUM SERPL-SCNC: 144 MMOL/L
WBC # FLD AUTO: 5.38 K/UL

## 2021-01-29 ENCOUNTER — APPOINTMENT (OUTPATIENT)
Dept: RHEUMATOLOGY | Facility: CLINIC | Age: 66
End: 2021-01-29

## 2021-01-29 ENCOUNTER — TRANSCRIPTION ENCOUNTER (OUTPATIENT)
Age: 66
End: 2021-01-29

## 2021-02-05 ENCOUNTER — APPOINTMENT (OUTPATIENT)
Dept: INTERNAL MEDICINE | Facility: CLINIC | Age: 66
End: 2021-02-05
Payer: COMMERCIAL

## 2021-02-05 ENCOUNTER — NON-APPOINTMENT (OUTPATIENT)
Age: 66
End: 2021-02-05

## 2021-02-05 VITALS
OXYGEN SATURATION: 96 % | BODY MASS INDEX: 22.32 KG/M2 | WEIGHT: 126 LBS | SYSTOLIC BLOOD PRESSURE: 142 MMHG | RESPIRATION RATE: 16 BRPM | TEMPERATURE: 98.1 F | DIASTOLIC BLOOD PRESSURE: 86 MMHG | HEART RATE: 87 BPM | HEIGHT: 63 IN

## 2021-02-05 DIAGNOSIS — J47.9 BRONCHIECTASIS, UNCOMPLICATED: ICD-10-CM

## 2021-02-05 DIAGNOSIS — Z87.891 PERSONAL HISTORY OF NICOTINE DEPENDENCE: ICD-10-CM

## 2021-02-05 DIAGNOSIS — I10 ESSENTIAL (PRIMARY) HYPERTENSION: ICD-10-CM

## 2021-02-05 PROCEDURE — 99072 ADDL SUPL MATRL&STAF TM PHE: CPT

## 2021-02-05 PROCEDURE — 99205 OFFICE O/P NEW HI 60 MIN: CPT

## 2021-02-05 NOTE — REVIEW OF SYSTEMS
[Cough] : cough [Joint Stiffness] : joint stiffness [Joint Swelling] : joint swelling [Negative] : Heme/Lymph

## 2021-02-05 NOTE — HISTORY OF PRESENT ILLNESS
[FreeTextEntry1] : The patient comes in today for a new patient pulmonary evaluation.\par  [de-identified] : The patient is a 65-year-old white female with a history of lupus, hypertension, and irritable bowel syndrome who presents for initial evaluation of a chronic cough.\par \par The patient states that she initially noted to have a significant leukopenia in her 20s. She was seen by hematologist. A workup was negative. She was told to just monitor her blood counts. She then developed Raynaud's syndrome and joint swelling with arthralgias several years down the road. Eventually she was diagnosed with lupus approximately 15 years ago. She was initially treated with Plaquenil and prednisone for 10 years with good results. She discontinued these medications 5 years ago, because her symptoms were stable and improved.\par \par The patient noted the onset of a cough approximately 15 months ago. This was approximately November of 2019. She denied any viral-type prodrome. The cough is primarily nonproductive, but she was having moderate paroxysms. She was seen by her primary care physician in treated with a Z-Juan without any improvement. She was then given a treatment with Bactrim again with no help. She does have postnasal drip. She was seen by an allergist, who performed spirometry which apparently was slightly abnormal. She was told however that she did not have asthma. She was placed on metered dose inhaler possibly albuterol in addition to Flonase. She did not have much of a response. She was then given a one-week course of prednisone which she (mistakenly) to 80 mg a day for one week as well as doxycycline and another Z-Juan. The cough actually resolved after that regimen. She then eventually discontinued the metered dose inhaler and Flonase as well.\par \par The cough again returned several months ago. She was seen by another pulmonologist who ordered a CAT scan of the chest. The study was remarkable for mild bronchiectatic changes with tree in bud infiltrates in the lingula. There were multiple nodular opacities as well as several calcified nodules. The nodules were unchanged dating back to a CAT scan that had previously been done in December of 2011. She was placed on an [employer] device to help improve mucus evacuation. She had no improvement in the cough persisted. She was then seen by a rheumatologist, Dr. Sinha, and a repeat CAT scan of the chest was performed. The study revealed mild bronchiectasis again in a similar location as well as slight tree in bud changes and a 4 mm nodule in the lingula. She was subsequently referred to this office for further evaluation. She now comes in for this assessment.

## 2021-02-05 NOTE — DATA REVIEWED
[FreeTextEntry1] : CAT scans of the chest were extensively reviewed from both July 2020, and January 2021.

## 2021-02-05 NOTE — PHYSICAL EXAM
[No Acute Distress] : no acute distress [Well Nourished] : well nourished [Well Developed] : well developed [Well-Appearing] : well-appearing [Normal Sclera/Conjunctiva] : normal sclera/conjunctiva [EOMI] : extraocular movements intact [Normal Outer Ear/Nose] : the outer ears and nose were normal in appearance [Normal Oropharynx] : the oropharynx was normal [No JVD] : no jugular venous distention [Supple] : supple [No Respiratory Distress] : no respiratory distress  [No Accessory Muscle Use] : no accessory muscle use [Clear to Auscultation] : lungs were clear to auscultation bilaterally [Normal Rate] : normal rate  [Regular Rhythm] : with a regular rhythm [Normal S1, S2] : normal S1 and S2 [No Murmur] : no murmur heard [No Edema] : there was no peripheral edema [No Extremity Clubbing/Cyanosis] : no extremity clubbing/cyanosis [Soft] : abdomen soft [Non Tender] : non-tender [Non-distended] : non-distended [No Masses] : no abdominal mass palpated [No HSM] : no HSM [Normal Bowel Sounds] : normal bowel sounds [Normal Supraclavicular Nodes] : no supraclavicular lymphadenopathy [Normal Posterior Cervical Nodes] : no posterior cervical lymphadenopathy [Normal Anterior Cervical Nodes] : no anterior cervical lymphadenopathy [No CVA Tenderness] : no CVA  tenderness [No Rash] : no rash [Coordination Grossly Intact] : coordination grossly intact [No Focal Deficits] : no focal deficits [Normal Gait] : normal gait [Deep Tendon Reflexes (DTR)] : deep tendon reflexes were 2+ and symmetric [Normal Affect] : the affect was normal [Normal Insight/Judgement] : insight and judgment were intact [de-identified] : A. minimal expiratory wheezes noted with forced maneuvers only.

## 2021-02-12 DIAGNOSIS — Z20.828 CONTACT WITH AND (SUSPECTED) EXPOSURE TO OTHER VIRAL COMMUNICABLE DISEASES: ICD-10-CM

## 2021-02-14 ENCOUNTER — APPOINTMENT (OUTPATIENT)
Dept: DISASTER EMERGENCY | Facility: CLINIC | Age: 66
End: 2021-02-14

## 2021-02-14 DIAGNOSIS — Z01.818 ENCOUNTER FOR OTHER PREPROCEDURAL EXAMINATION: ICD-10-CM

## 2021-02-15 LAB — SARS-COV-2 N GENE NPH QL NAA+PROBE: NOT DETECTED

## 2021-02-17 ENCOUNTER — APPOINTMENT (OUTPATIENT)
Dept: INTERNAL MEDICINE | Facility: CLINIC | Age: 66
End: 2021-02-17
Payer: COMMERCIAL

## 2021-02-17 ENCOUNTER — APPOINTMENT (OUTPATIENT)
Dept: INTERNAL MEDICINE | Facility: CLINIC | Age: 66
End: 2021-02-17

## 2021-02-17 VITALS
TEMPERATURE: 98.1 F | DIASTOLIC BLOOD PRESSURE: 80 MMHG | WEIGHT: 129 LBS | RESPIRATION RATE: 18 BRPM | SYSTOLIC BLOOD PRESSURE: 120 MMHG | HEART RATE: 99 BPM | BODY MASS INDEX: 22.86 KG/M2 | OXYGEN SATURATION: 97 % | HEIGHT: 63 IN

## 2021-02-17 DIAGNOSIS — J98.4 OTHER DISORDERS OF LUNG: ICD-10-CM

## 2021-02-17 PROCEDURE — 99072 ADDL SUPL MATRL&STAF TM PHE: CPT

## 2021-02-17 PROCEDURE — 94010 BREATHING CAPACITY TEST: CPT

## 2021-02-17 PROCEDURE — 94727 GAS DIL/WSHOT DETER LNG VOL: CPT

## 2021-02-17 PROCEDURE — 94729 DIFFUSING CAPACITY: CPT

## 2021-02-18 PROBLEM — J98.4 RESTRICTIVE LUNG DISEASE: Status: ACTIVE | Noted: 2021-02-18

## 2021-02-19 ENCOUNTER — NON-APPOINTMENT (OUTPATIENT)
Age: 66
End: 2021-02-19

## 2021-02-19 RX ORDER — BECLOMETHASONE DIPROPIONATE HFA 80 UG/1
80 AEROSOL, METERED RESPIRATORY (INHALATION) TWICE DAILY
Qty: 1 | Refills: 11 | Status: ACTIVE | COMMUNITY
Start: 2021-02-19 | End: 1900-01-01

## 2021-04-26 ENCOUNTER — APPOINTMENT (OUTPATIENT)
Dept: INTERNAL MEDICINE | Facility: CLINIC | Age: 66
End: 2021-04-26

## 2021-09-08 ENCOUNTER — TRANSCRIPTION ENCOUNTER (OUTPATIENT)
Age: 66
End: 2021-09-08

## 2021-10-01 ENCOUNTER — APPOINTMENT (OUTPATIENT)
Dept: RHEUMATOLOGY | Facility: CLINIC | Age: 66
End: 2021-10-01
Payer: COMMERCIAL

## 2021-10-01 ENCOUNTER — LABORATORY RESULT (OUTPATIENT)
Age: 66
End: 2021-10-01

## 2021-10-01 VITALS
HEART RATE: 78 BPM | OXYGEN SATURATION: 98 % | WEIGHT: 127 LBS | DIASTOLIC BLOOD PRESSURE: 68 MMHG | BODY MASS INDEX: 22.5 KG/M2 | TEMPERATURE: 97.7 F | SYSTOLIC BLOOD PRESSURE: 110 MMHG

## 2021-10-01 DIAGNOSIS — J84.10 PULMONARY FIBROSIS, UNSPECIFIED: ICD-10-CM

## 2021-10-01 DIAGNOSIS — J44.9 CHRONIC OBSTRUCTIVE PULMONARY DISEASE, UNSPECIFIED: ICD-10-CM

## 2021-10-01 LAB
25(OH)D3 SERPL-MCNC: 32.9 NG/ML
ALBUMIN SERPL ELPH-MCNC: 4.6 G/DL
ALP BLD-CCNC: 93 U/L
ALT SERPL-CCNC: 30 U/L
ANION GAP SERPL CALC-SCNC: 11 MMOL/L
AST SERPL-CCNC: 34 U/L
BASOPHILS # BLD AUTO: 0.03 K/UL
BASOPHILS NFR BLD AUTO: 0.5 %
BILIRUB SERPL-MCNC: 0.4 MG/DL
BUN SERPL-MCNC: 20 MG/DL
CALCIUM SERPL-MCNC: 9.2 MG/DL
CALCIUM SERPL-MCNC: 9.2 MG/DL
CHLORIDE SERPL-SCNC: 105 MMOL/L
CO2 SERPL-SCNC: 27 MMOL/L
CREAT SERPL-MCNC: 0.7 MG/DL
CRP SERPL-MCNC: 5 MG/L
EOSINOPHIL # BLD AUTO: 0.11 K/UL
EOSINOPHIL NFR BLD AUTO: 1.8 %
ERYTHROCYTE [SEDIMENTATION RATE] IN BLOOD BY WESTERGREN METHOD: 42 MM/HR
GLUCOSE SERPL-MCNC: 105 MG/DL
HCT VFR BLD CALC: 42.9 %
HGB BLD-MCNC: 13.8 G/DL
IMM GRANULOCYTES NFR BLD AUTO: 0.3 %
LYMPHOCYTES # BLD AUTO: 1.25 K/UL
LYMPHOCYTES NFR BLD AUTO: 20.4 %
MAN DIFF?: NORMAL
MCHC RBC-ENTMCNC: 30.8 PG
MCHC RBC-ENTMCNC: 32.2 GM/DL
MCV RBC AUTO: 95.8 FL
MONOCYTES # BLD AUTO: 0.62 K/UL
MONOCYTES NFR BLD AUTO: 10.1 %
NEUTROPHILS # BLD AUTO: 4.09 K/UL
NEUTROPHILS NFR BLD AUTO: 66.9 %
PARATHYROID HORMONE INTACT: 27 PG/ML
PLATELET # BLD AUTO: 244 K/UL
POTASSIUM SERPL-SCNC: 3.9 MMOL/L
PROT SERPL-MCNC: 7 G/DL
RBC # BLD: 4.48 M/UL
RBC # FLD: 13.1 %
SODIUM SERPL-SCNC: 142 MMOL/L
TSH SERPL-ACNC: 1.88 UIU/ML
WBC # FLD AUTO: 6.12 K/UL

## 2021-10-01 PROCEDURE — 99214 OFFICE O/P EST MOD 30 MIN: CPT

## 2021-10-01 RX ORDER — ROSUVASTATIN CALCIUM 10 MG/1
10 TABLET, FILM COATED ORAL
Refills: 0 | Status: ACTIVE | COMMUNITY

## 2021-10-01 RX ORDER — ZOLEDRONIC ACID 5 MG/100ML
5 INJECTION INTRAVENOUS
Refills: 0 | Status: DISCONTINUED | COMMUNITY
Start: 2018-05-04 | End: 2021-10-01

## 2021-10-01 RX ORDER — ASPIRIN 81 MG
81 TABLET, DELAYED RELEASE (ENTERIC COATED) ORAL
Refills: 0 | Status: ACTIVE | COMMUNITY

## 2021-10-01 RX ORDER — METOPROLOL TARTRATE 75 MG/1
TABLET, FILM COATED ORAL
Qty: 90 | Refills: 0 | Status: ACTIVE | COMMUNITY

## 2021-10-01 RX ORDER — TIZANIDINE 2 MG/1
2 TABLET ORAL
Qty: 60 | Refills: 5 | Status: DISCONTINUED | COMMUNITY
Start: 2018-09-07 | End: 2021-10-01

## 2021-10-01 RX ORDER — CLOPIDOGREL 75 MG/1
75 TABLET, FILM COATED ORAL
Refills: 0 | Status: ACTIVE | COMMUNITY

## 2021-10-02 ENCOUNTER — LABORATORY RESULT (OUTPATIENT)
Age: 66
End: 2021-10-02

## 2021-10-02 LAB
APPEARANCE: CLEAR
BILIRUBIN URINE: NEGATIVE
BLOOD URINE: NEGATIVE
C3 SERPL-MCNC: 135 MG/DL
C4 SERPL-MCNC: 26 MG/DL
COLOR: YELLOW
CREAT SPEC-SCNC: 168 MG/DL
CREAT/PROT UR: 0.1 RATIO
DSDNA AB SER-ACNC: <12 IU/ML
GLUCOSE QUALITATIVE U: NEGATIVE
KETONES URINE: NEGATIVE
LEUKOCYTE ESTERASE URINE: ABNORMAL
NITRITE URINE: NEGATIVE
PH URINE: 5.5
PROT UR-MCNC: 14 MG/DL
PROTEIN URINE: NORMAL
SPECIFIC GRAVITY URINE: 1.03
UROBILINOGEN URINE: NORMAL

## 2021-10-02 NOTE — HISTORY OF PRESENT ILLNESS
[FreeTextEntry1] : 10/1/21 \par FIRST VISIT WITH ME.. Adrian from Dr Sinha\par - Dx with SLE ys ago presented with + serologies and diffuse arthralgias/ fatigue.  In past on high dose HCQ 50 mg, tapered off over past few ys without return of sx \par - Had been experiencing WILKINS for > 1 yr, eventually dx with advanced CAD and required 4 stents.. now doing well.. no hx of CHF.. WILKINS / fatigue fully resolved.  Still adjusting meds for ideal BP recently lowered Amlodipine/ and metoprolol to lowest doses no longer feeling dizzy\par - OP:  qoy Reclast, not tolerated well severe bony pain... doesn't want to continue \par - overall feeling very well, off all DMARds for several years now.. \par - OA hand with CMC squaring and diffuse DIP proliferative changes \par - continues\par \par 1) SLE\par 2) CAD\par 3) OP\par 4) OA primarily hands \par 5) FM mild \par \par ____________________________________________________________________________\par f/u visit\par 65 year old female well known to me for many years. She has a diagnosis of SLE, osteopenia and hand OA \par as far as her lupus is concerned she was diagnosed prior to seeing me, she has had minor symptoms mostly with positive serologies and negative APL antibodies. She has been doing well and her SLE has been quiescent for the past few years and I have been able to wean her down on the plaquenil from 500 mg to now off. She has done  very well without PLq.\par She denies alopecia, oral ulcers, sicca symptoms, or Raynauds. Her joint pains have been stable, she rates it at a 1/10, with some fatigue. She continues to work.\par She has osteopenia and has been reclast q 2 years. \par  she had a CT chest that as per pt showed spots of mucus in her lungs. she was given a ? incentive spirometer by pul, states that she had no change for 2 months. She also saw allergy, and was treated with abx and is better now\par She has a good appetite and denies wt loss. She denies fevers, chills or night sweats. She is uptodate on her age appropriate screens.

## 2021-10-02 NOTE — PHYSICAL EXAM
[General Appearance - Alert] : alert [General Appearance - Well Nourished] : well nourished [General Appearance - Well Developed] : well developed [Sclera] : the sclera and conjunctiva were normal [Oropharynx] : the oropharynx was normal [Neck Appearance] : the appearance of the neck was normal [Thyroid Diffuse Enlargement] : the thyroid was not enlarged [Respiration, Rhythm And Depth] : normal respiratory rhythm and effort [Auscultation Breath Sounds / Voice Sounds] : lungs were clear to auscultation bilaterally [Heart Sounds] : normal S1 and S2 [Murmurs] : no murmurs [Edema] : there was no peripheral edema [Abdomen Tenderness] : non-tender [Cervical Lymph Nodes Enlarged Anterior Bilaterally] : anterior cervical [Supraclavicular Lymph Nodes Enlarged Bilaterally] : supraclavicular [No CVA Tenderness] : no ~M costovertebral angle tenderness [No Spinal Tenderness] : no spinal tenderness [Abnormal Walk] : normal gait [Nail Clubbing] : no clubbing  or cyanosis of the fingernails [Musculoskeletal - Swelling] : no joint swelling seen [Motor Tone] : muscle strength and tone were normal [] : no rash [Motor Exam] : the motor exam was normal [Oriented To Time, Place, And Person] : oriented to person, place, and time [Impaired Insight] : insight and judgment were intact [Affect] : the affect was normal [General Appearance - In No Acute Distress] : in no acute distress [General Appearance - Well-Appearing] : healthy appearing [Extraocular Movements] : extraocular movements were intact [PERRL With Normal Accommodation] : pupils were equal in size, round, and reactive to light [Outer Ear] : the ears and nose were normal in appearance [Nasal Cavity] : the nasal mucosa and septum were normal [Neck Cervical Mass (___cm)] : no neck mass was observed [Jugular Venous Distention Increased] : there was no jugular-venous distention [Thyroid Nodule] : there were no palpable thyroid nodules [Heart Rate And Rhythm] : heart rate was normal and rhythm regular [Heart Sounds Gallop] : no gallops [Heart Sounds Pericardial Friction Rub] : no pericardial rub [Bowel Sounds] : normal bowel sounds [Abdomen Soft] : soft [Abdomen Mass (___ Cm)] : no abdominal mass palpated [Cervical Lymph Nodes Enlarged Posterior Bilaterally] : posterior cervical [FreeTextEntry1] : FROM all joints, no tender points noted, hands with OA changes, with Heberdens nodes and CMC squaring- most uncomfortable today at R 5 PIP.. bony change and mild ttt  [Skin Color & Pigmentation] : normal skin color and pigmentation [Skin Turgor] : normal skin turgor

## 2021-10-02 NOTE — REVIEW OF SYSTEMS
[Arthralgias] : arthralgias [Fever] : no fever [Chills] : no chills [Feeling Poorly] : not feeling poorly [Feeling Tired] : not feeling tired [Eye Pain] : no eye pain [Dry Eyes] : no dryness of the eyes [Loss Of Hearing] : no hearing loss [Chest Pain] : no chest pain [Palpitations] : no palpitations [Shortness Of Breath] : no shortness of breath [Cough] : no cough [SOB on Exertion] : shortness of breath during exertion [As Noted in HPI] : as noted in HPI [Abdominal Pain] : no abdominal pain [Heartburn] : heartburn [Dysuria] : no dysuria [Joint Pain] : no joint pain [Joint Swelling] : no joint swelling [Joint Stiffness] : no joint stiffness [Skin Lesions] : no skin lesions [Limb Weakness] : no limb weakness [Difficulty Walking] : no difficulty walking [Sleep Disturbances] : sleep disturbances [Anxiety] : no anxiety [Muscle Weakness] : no muscle weakness [Depression] : no depression [Easy Bruising] : no tendency for easy bruising [Negative] : Heme/Lymph [FreeTextEntry7] : PRN use of Pepcid [de-identified] : controlled w/ amitrip [FreeTextEntry9] : nothing recent except hands - mild CMC and R 5 PIP

## 2021-10-02 NOTE — ASSESSMENT
[FreeTextEntry1] : 65 year old female She has a diagnosis of SLE, osteopenia and hand OA presents for routine management today. \par R eye spasticity + response to BOtox routinely \par \par \par 1) SLE- PAMELLA 1:160S serologies neg past few ys (? full profile unknown).. w/ nl C3/4 and no proteinuria even off tx.  Minimal systemic c/o now or in past few ys.. tapered off high of 500 mg HCQ completely off 2019... no return of sx.  \par Previous WILKINS w/u + CAD (see below) \par Her review of systems continues to be negative \par \par 2) Muscle spasm/ secondary FMS-PRN use Tizanidine when severe, nothing recent but c/w amitrip 10-20 mg with + response \par \par 3) Osteoporosis:  Last DExa 7/23/20 w/ most severe T score -2.7 at L1 and -2.4 at LFN w/ frax 11/ 2.4%... and essentially stable over past 4 ys on QOY Reclast, but hates bony pain experienced for several wks after each  She is to use calcium and vitamin D at 1200 mg qd. \par Given low frax have held tx since then.  Will get updated study next year.. \par \par 4) Hand OA-\par She has underlying OA and has had steroid injections in her 5th PIP in the past on the right hand and b/l CMC.. not severe at this time. \par \par 5) CAD:  initially presented with SOB/ WILKINS.. dramatic improvement with 5 stents.. again recommend possible low dose HCQ to prevent low grade inflammation associated w/ SLE but at this time little evidence of SLE .. . \par \par 6) Chronic intermittent Cough- non productive and not associated w/ SOB..\par -Ct chest reviewed, ANCAs negative with allergy, follows closely with Dr. Chen.  No tx needed at this time.  \par \par Plan: \par - no change in tx at this point ... could consider 200 mg qod HCQ for CV protection, but doesn't sound like SLE was ever severe- ? not sure why 500 mg HCQ used??\par \par - She is aware to call if her symptoms worsen. f/u 4 months. \par

## 2021-10-06 PROBLEM — I10 ESSENTIAL HYPERTENSION: Status: ACTIVE | Noted: 2021-02-05

## 2021-10-06 PROBLEM — J44.9 OBSTRUCTIVE LUNG DISEASE: Status: ACTIVE | Noted: 2021-02-18

## 2021-10-15 ENCOUNTER — APPOINTMENT (OUTPATIENT)
Dept: INTERNAL MEDICINE | Facility: CLINIC | Age: 66
End: 2021-10-15

## 2022-03-24 ENCOUNTER — LABORATORY RESULT (OUTPATIENT)
Age: 67
End: 2022-03-24

## 2022-04-01 ENCOUNTER — LABORATORY RESULT (OUTPATIENT)
Age: 67
End: 2022-04-01

## 2022-04-01 ENCOUNTER — TRANSCRIPTION ENCOUNTER (OUTPATIENT)
Age: 67
End: 2022-04-01

## 2022-04-08 ENCOUNTER — APPOINTMENT (OUTPATIENT)
Dept: RHEUMATOLOGY | Facility: CLINIC | Age: 67
End: 2022-04-08
Payer: COMMERCIAL

## 2022-04-08 VITALS
BODY MASS INDEX: 23.04 KG/M2 | HEART RATE: 118 BPM | DIASTOLIC BLOOD PRESSURE: 80 MMHG | TEMPERATURE: 97.1 F | SYSTOLIC BLOOD PRESSURE: 120 MMHG | OXYGEN SATURATION: 95 % | HEIGHT: 63 IN | WEIGHT: 130 LBS

## 2022-04-08 DIAGNOSIS — M15.9 POLYOSTEOARTHRITIS, UNSPECIFIED: ICD-10-CM

## 2022-04-08 PROCEDURE — 99213 OFFICE O/P EST LOW 20 MIN: CPT

## 2022-04-08 RX ORDER — PREDNISONE 20 MG/1
20 TABLET ORAL DAILY
Qty: 17 | Refills: 0 | Status: DISCONTINUED | COMMUNITY
Start: 2021-02-05 | End: 2022-04-08

## 2022-04-08 RX ORDER — PANTOPRAZOLE 20 MG/1
20 TABLET, DELAYED RELEASE ORAL TWICE DAILY
Qty: 90 | Refills: 1 | Status: ACTIVE | COMMUNITY
Start: 2022-04-08

## 2022-04-08 RX ORDER — AMITRIPTYLINE HYDROCHLORIDE 10 MG/1
10 TABLET, FILM COATED ORAL
Qty: 180 | Refills: 2 | Status: DISCONTINUED | COMMUNITY
Start: 2018-03-19 | End: 2022-04-08

## 2022-04-08 NOTE — ASSESSMENT
[FreeTextEntry1] : 65 year old female She has a diagnosis of SLE, CAD s/p 4 STENTs, hx bronchiectasis (resolved), osteopenia and hand OA presents for routine management today. \par R eye spasticity + response to BOtox routinely \par \par \par 1) SLE- PAMELLA 1:160S serologies neg past few ys (? full profile unknown).. w/ nl C3/4 and no proteinuria even off tx.  Minimal systemic c/o now or in past few ys.. tapered off high of 500 mg HCQ completely off 2019... no return of sx.  updated serologies 9/21 neg-> repeat 4/22 still neg \par Previous WILKINS w/u + CAD (see below) \par Her review of systems continues to be negative \par \par 2) Muscle spasm/ secondary FMS-PRN use Tizanidine when severe, nothing recent but c/w amitrip 10-20 mg with + response initially, now reports rarely effective.  Will try cyclobenzaprine reserving tizanidine only for severe spasticity  \par \par 3) Osteoporosis:  Last DExa 7/23/20 w/ most severe T score -2.7 at L1 and -2.4 at LFN w/ frax 11/ 2.4%... and essentially stable over past 4 ys on QOY Reclast, but hates bony pain experienced for several wks after each  She is to use calcium and vitamin D at 1200 mg qd. \par Given low frax have held tx since then.  Will get updated study next year.. \par \par 4) Hand OA-\par She has underlying OA and has had steroid injections in her 5th PIP in the past on the right hand and b/l CMC.. not severe at this time.  Needs baseline imaging XR ordered \par \par 5) CAD:  initially presented with SOB/ WILKINS.. dramatic improvement with 5 stents.. again recommend possible low dose HCQ to prevent low grade inflammation associated w/ SLE but at this time little evidence of SLE .. Cardiologist did not feel this was necessary . \par \par 6) Chronic intermittent Cough- non productive and not associated w/ SOB..\par -Ct chest reviewed, ANCAs negative with allergy, follows closely with Dr. Chen- dx bronchiectasis.  No tx needed at this time.  \par \par Plan: \par - no change in tx at this point ... could consider 200 mg qod HCQ for CV protection, but doesn't sound like SLE was ever severe- ? not sure why 500 mg HCQ used??- unclear\par \par - Dexa updated.  If we use Reclast would premed w/ steroids night before and night of.. only had 1 dose thus far\par \par - XR hands \par \par - stop amitriptyline and switch to cyclobenzaprine \par \par - She is aware to call if her symptoms worsen. f/u 4 months. \par

## 2022-04-08 NOTE — PHYSICAL EXAM
[General Appearance - Alert] : alert [General Appearance - In No Acute Distress] : in no acute distress [General Appearance - Well Nourished] : well nourished [General Appearance - Well Developed] : well developed [General Appearance - Well-Appearing] : healthy appearing [Sclera] : the sclera and conjunctiva were normal [PERRL With Normal Accommodation] : pupils were equal in size, round, and reactive to light [Extraocular Movements] : extraocular movements were intact [Outer Ear] : the ears and nose were normal in appearance [Nasal Cavity] : the nasal mucosa and septum were normal [Oropharynx] : the oropharynx was normal [Neck Appearance] : the appearance of the neck was normal [Neck Cervical Mass (___cm)] : no neck mass was observed [Jugular Venous Distention Increased] : there was no jugular-venous distention [Thyroid Diffuse Enlargement] : the thyroid was not enlarged [Thyroid Nodule] : there were no palpable thyroid nodules [Respiration, Rhythm And Depth] : normal respiratory rhythm and effort [Auscultation Breath Sounds / Voice Sounds] : lungs were clear to auscultation bilaterally [Heart Rate And Rhythm] : heart rate was normal and rhythm regular [Heart Sounds] : normal S1 and S2 [Heart Sounds Gallop] : no gallops [Murmurs] : no murmurs [Heart Sounds Pericardial Friction Rub] : no pericardial rub [Edema] : there was no peripheral edema [Cervical Lymph Nodes Enlarged Posterior Bilaterally] : posterior cervical [Cervical Lymph Nodes Enlarged Anterior Bilaterally] : anterior cervical [Supraclavicular Lymph Nodes Enlarged Bilaterally] : supraclavicular [No CVA Tenderness] : no ~M costovertebral angle tenderness [No Spinal Tenderness] : no spinal tenderness [Abnormal Walk] : normal gait [Nail Clubbing] : no clubbing  or cyanosis of the fingernails [Musculoskeletal - Swelling] : no joint swelling seen [Motor Tone] : muscle strength and tone were normal [Skin Color & Pigmentation] : normal skin color and pigmentation [Skin Turgor] : normal skin turgor [] : no rash [Motor Exam] : the motor exam was normal [Oriented To Time, Place, And Person] : oriented to person, place, and time [Impaired Insight] : insight and judgment were intact [Affect] : the affect was normal [FreeTextEntry1] : FROM all joints, no tender points noted, hands with OA changes, with Heberdens nodes and CMC squaring- most uncomfortable today at R 5 PIP.. bony change and mild ttt

## 2022-04-08 NOTE — HISTORY OF PRESENT ILLNESS
[FreeTextEntry1] : 4/8/22\par -- Had been experiencing WILKINS for > 1 yr, eventually dx with advanced CAD and required 4 stents.. now doing well.. no hx of CHF.. WILKINS / fatigue fully resolved.  Still adjusting meds for ideal BP recently lowered Amlodipine/ and metoprolol to lowest doses no longer feeling dizzy\par - needs updated DExa last Reclast 5/3/18 DID not feel well with severe \par - persistent GERD updated EGD/ and colonoscopy + esophageal irritation.. on pantoprazole 20 mg \par \par ______________________________________________\par 10/1/21 \par FIRST VISIT WITH ME.. Adrian from Dr Sinha\par - Dx with SLE ys ago presented with + serologies and diffuse arthralgias/ fatigue.  In past on high dose HCQ 50 mg, tapered off over past few ys without return of sx \par - OP:  qoy Reclast, not tolerated well severe bony pain... doesn't want to continue \par \par \par 1) SLE\par 2) CAD\par 3) OP\par 4) OA primarily hands \par 5) FM mild \par \par ____________________________________________________________________________\par f/u visit\par 65 year old female well known to me for many years. She has a diagnosis of SLE, osteopenia and hand OA \par as far as her lupus is concerned she was diagnosed prior to seeing me, she has had minor symptoms mostly with positive serologies and negative APL antibodies. She has been doing well and her SLE has been quiescent for the past few years and I have been able to wean her down on the plaquenil from 500 mg to now off. She has done  very well without PLq.\par She denies alopecia, oral ulcers, sicca symptoms, or Raynauds. Her joint pains have been stable, she rates it at a 1/10, with some fatigue. She continues to work.\par She has osteopenia and has been reclast q 2 years. \par  she had a CT chest that as per pt showed spots of mucus in her lungs. she was given a ? incentive spirometer by pul, states that she had no change for 2 months. She also saw allergy, and was treated with abx and is better now\par She has a good appetite and denies wt loss. She denies fevers, chills or night sweats. She is uptodate on her age appropriate screens.

## 2022-04-08 NOTE — REVIEW OF SYSTEMS
[SOB on Exertion] : shortness of breath during exertion [As Noted in HPI] : as noted in HPI [Heartburn] : heartburn [Arthralgias] : arthralgias [Sleep Disturbances] : sleep disturbances [Negative] : Heme/Lymph [Fever] : no fever [Chills] : no chills [Feeling Poorly] : not feeling poorly [Feeling Tired] : not feeling tired [Eye Pain] : no eye pain [Dry Eyes] : no dryness of the eyes [Loss Of Hearing] : no hearing loss [Chest Pain] : no chest pain [Palpitations] : no palpitations [Shortness Of Breath] : no shortness of breath [Cough] : no cough [Abdominal Pain] : no abdominal pain [Dysuria] : no dysuria [Joint Pain] : no joint pain [Joint Swelling] : no joint swelling [Joint Stiffness] : no joint stiffness [Skin Lesions] : no skin lesions [Limb Weakness] : no limb weakness [Difficulty Walking] : no difficulty walking [Anxiety] : no anxiety [Depression] : no depression [Muscle Weakness] : no muscle weakness [Easy Bruising] : no tendency for easy bruising [FreeTextEntry7] : PRN use of Pepcid [FreeTextEntry9] : nothing recent except hands - mild CMC and R 5 PIP  [de-identified] : controlled w/ amitrip

## 2022-04-13 LAB
25(OH)D3 SERPL-MCNC: 26.8 NG/ML
ALBUMIN SERPL ELPH-MCNC: 4.7 G/DL
ALP BLD-CCNC: 99 U/L
ALT SERPL-CCNC: 23 U/L
ANION GAP SERPL CALC-SCNC: 13 MMOL/L
APPEARANCE: CLEAR
AST SERPL-CCNC: 25 U/L
BASOPHILS # BLD AUTO: 0.02 K/UL
BASOPHILS NFR BLD AUTO: 0.3 %
BILIRUB SERPL-MCNC: 0.3 MG/DL
BILIRUBIN URINE: NEGATIVE
BLOOD URINE: NEGATIVE
BUN SERPL-MCNC: 21 MG/DL
C3 SERPL-MCNC: 136 MG/DL
C4 SERPL-MCNC: 25 MG/DL
CALCIUM SERPL-MCNC: 9.8 MG/DL
CALCIUM SERPL-MCNC: 9.8 MG/DL
CHLORIDE SERPL-SCNC: 107 MMOL/L
CO2 SERPL-SCNC: 24 MMOL/L
COLOR: NORMAL
CREAT SERPL-MCNC: 0.74 MG/DL
CRP SERPL-MCNC: <3 MG/L
DSDNA AB SER-ACNC: <12 IU/ML
EGFR: 89 ML/MIN/1.73M2
EOSINOPHIL # BLD AUTO: 0.13 K/UL
EOSINOPHIL NFR BLD AUTO: 2.3 %
ERYTHROCYTE [SEDIMENTATION RATE] IN BLOOD BY WESTERGREN METHOD: 8 MM/HR
GLUCOSE QUALITATIVE U: NEGATIVE
GLUCOSE SERPL-MCNC: 82 MG/DL
HCT VFR BLD CALC: 42.2 %
HGB BLD-MCNC: 13.4 G/DL
IMM GRANULOCYTES NFR BLD AUTO: 0.3 %
KETONES URINE: NEGATIVE
LEUKOCYTE ESTERASE URINE: ABNORMAL
LYMPHOCYTES # BLD AUTO: 1.57 K/UL
LYMPHOCYTES NFR BLD AUTO: 27.3 %
MAN DIFF?: NORMAL
MCHC RBC-ENTMCNC: 30.5 PG
MCHC RBC-ENTMCNC: 31.8 GM/DL
MCV RBC AUTO: 95.9 FL
MONOCYTES # BLD AUTO: 0.48 K/UL
MONOCYTES NFR BLD AUTO: 8.3 %
NEUTROPHILS # BLD AUTO: 3.54 K/UL
NEUTROPHILS NFR BLD AUTO: 61.5 %
NITRITE URINE: NEGATIVE
PARATHYROID HORMONE INTACT: 26 PG/ML
PH URINE: 5.5
PLATELET # BLD AUTO: 234 K/UL
POTASSIUM SERPL-SCNC: 4.5 MMOL/L
PROT SERPL-MCNC: 7 G/DL
PROTEIN URINE: NEGATIVE
RBC # BLD: 4.4 M/UL
RBC # FLD: 13.9 %
SODIUM SERPL-SCNC: 144 MMOL/L
SPECIFIC GRAVITY URINE: 1.03
UROBILINOGEN URINE: NORMAL
WBC # FLD AUTO: 5.76 K/UL

## 2022-05-31 DIAGNOSIS — M79.646 PAIN IN UNSPECIFIED FINGER(S): ICD-10-CM

## 2022-05-31 RX ORDER — DICLOFENAC SODIUM 20 MG/G
2 SOLUTION TOPICAL
Qty: 1 | Refills: 2 | Status: ACTIVE | COMMUNITY
Start: 2022-05-31 | End: 1900-01-01

## 2022-06-16 ENCOUNTER — NON-APPOINTMENT (OUTPATIENT)
Age: 67
End: 2022-06-16

## 2022-09-09 ENCOUNTER — RX RENEWAL (OUTPATIENT)
Age: 67
End: 2022-09-09

## 2022-10-18 ENCOUNTER — NON-APPOINTMENT (OUTPATIENT)
Age: 67
End: 2022-10-18

## 2022-11-17 ENCOUNTER — OFFICE (OUTPATIENT)
Dept: URBAN - METROPOLITAN AREA CLINIC 100 | Facility: CLINIC | Age: 67
Setting detail: OPHTHALMOLOGY
End: 2022-11-17
Payer: COMMERCIAL

## 2022-11-17 DIAGNOSIS — H00.14: ICD-10-CM

## 2022-11-17 DIAGNOSIS — H35.3121: ICD-10-CM

## 2022-11-17 DIAGNOSIS — H00.11: ICD-10-CM

## 2022-11-17 DIAGNOSIS — H52.7: ICD-10-CM

## 2022-11-17 PROCEDURE — 92015 DETERMINE REFRACTIVE STATE: CPT | Performed by: OPHTHALMOLOGY

## 2022-11-17 PROCEDURE — 99213 OFFICE O/P EST LOW 20 MIN: CPT | Performed by: OPHTHALMOLOGY

## 2022-11-17 PROCEDURE — 92134 CPTRZ OPH DX IMG PST SGM RTA: CPT | Performed by: OPHTHALMOLOGY

## 2022-11-17 ASSESSMENT — CONFRONTATIONAL VISUAL FIELD TEST (CVF)
OD_FINDINGS: FULL
OS_FINDINGS: FULL

## 2022-11-17 ASSESSMENT — AXIALLENGTH_DERIVED
OD_AL: 23.2329
OS_AL: 23.5544
OD_AL: 22.953
OS_AL: 22.7118
OD_AL: 22.953
OD_AL: 22.953
OS_AL: 22.5327
OS_AL: 22.5327

## 2022-11-17 ASSESSMENT — KERATOMETRY
OS_AXISANGLE_DEGREES: 098
OS_K1POWER_DIOPTERS: 43.25
OD_AXISANGLE_DEGREES: 099
OS_K2POWER_DIOPTERS: 44.75
OD_K2POWER_DIOPTERS: 45.25
OD_K1POWER_DIOPTERS: 44.00
METHOD_AUTO_MANUAL: MANUAL

## 2022-11-17 ASSESSMENT — REFRACTION_MANIFEST
OU_VA: 20/20
OS_AXIS: 180
OD_AXIS: 100
OD_CYLINDER: -0.25
OS_AXIS: 180
OS_VA1: 20/50
OS_CYLINDER: -0.25
OS_CYLINDER: -0.25
OS_ADD: +2.50
OD_CYLINDER: -0.25
OD_VA1: 20/20
OS_SPHERE: +2.50
OS_VA1: 20/50
OD_SPHERE: +0.75
OD_AXIS: 100
OS_SPHERE: +2.00
OD_SPHERE: +0.75
OD_VA1: 20/20
OD_ADD: +2.50

## 2022-11-17 ASSESSMENT — REFRACTION_AUTOREFRACTION
OS_CYLINDER: -0.25
OD_SPHERE: 0.00
OS_SPHERE: -0.25
OS_AXIS: 80
OS_AXIS: 177
OD_AXIS: 101
OD_SPHERE: +0.75
OD_CYLINDER: -0.25
OS_SPHERE: +2.50
OD_AXIS: 097
OS_CYLINDER: -0.25
OD_CYLINDER: -0.25

## 2022-11-17 ASSESSMENT — REFRACTION_CURRENTRX
OS_CYLINDER: SPHERE
OS_AXIS: 091
OD_CYLINDER: SPHERE
OD_CYLINDER: -1.00
OD_VPRISM_DIRECTION: SV
OS_OVR_VA: 20/
OS_CYLINDER: -1.00
OD_VPRISM_DIRECTION: SV
OS_OVR_VA: 20/
OS_SPHERE: +1.25
OS_VPRISM_DIRECTION: SV
OD_SPHERE: +2.25
OS_SPHERE: +2.25
OD_SPHERE: +1.25
OD_OVR_VA: 20/
OS_VPRISM_DIRECTION: SV
OD_OVR_VA: 20/
OD_AXIS: 102

## 2022-11-17 ASSESSMENT — SPHEQUIV_DERIVED
OS_SPHEQUIV: -0.375
OS_SPHEQUIV: 2.375
OD_SPHEQUIV: 0.625
OD_SPHEQUIV: 0.625
OS_SPHEQUIV: 2.375
OS_SPHEQUIV: 1.875
OD_SPHEQUIV: -0.125
OD_SPHEQUIV: 0.625

## 2022-11-17 ASSESSMENT — SUPERFICIAL PUNCTATE KERATITIS (SPK)
OS_SPK: T
OD_SPK: T

## 2022-11-17 ASSESSMENT — LID EXAM ASSESSMENTS
OS_BLEPHARITIS: LUL
OD_BLEPHARITIS: RUL

## 2022-11-17 ASSESSMENT — VISUAL ACUITY
OD_BCVA: 20/100
OS_BCVA: 20/25

## 2022-11-17 ASSESSMENT — TONOMETRY
OS_IOP_MMHG: 17
OD_IOP_MMHG: 21

## 2022-11-18 PROBLEM — H01.005 BLEPHARITIS; RIGHT UPPER LID, RIGHT LOWER LID, LEFT UPPER LID, LEFT LOWER LID: Status: ACTIVE | Noted: 2022-11-18

## 2022-11-18 PROBLEM — H01.002 BLEPHARITIS; RIGHT UPPER LID, RIGHT LOWER LID, LEFT UPPER LID, LEFT LOWER LID: Status: ACTIVE | Noted: 2022-11-18

## 2022-11-18 PROBLEM — H01.004 BLEPHARITIS; RIGHT UPPER LID, RIGHT LOWER LID, LEFT UPPER LID, LEFT LOWER LID: Status: ACTIVE | Noted: 2022-11-18

## 2022-11-18 PROBLEM — H01.001 BLEPHARITIS; RIGHT UPPER LID, RIGHT LOWER LID, LEFT UPPER LID, LEFT LOWER LID: Status: ACTIVE | Noted: 2022-11-18

## 2023-01-14 ENCOUNTER — RX RENEWAL (OUTPATIENT)
Age: 68
End: 2023-01-14

## 2023-01-20 ENCOUNTER — OFFICE (OUTPATIENT)
Dept: URBAN - METROPOLITAN AREA CLINIC 35 | Facility: CLINIC | Age: 68
Setting detail: OPHTHALMOLOGY
End: 2023-01-20
Payer: COMMERCIAL

## 2023-01-20 ENCOUNTER — APPOINTMENT (OUTPATIENT)
Dept: RHEUMATOLOGY | Facility: CLINIC | Age: 68
End: 2023-01-20

## 2023-01-20 DIAGNOSIS — H00.11: ICD-10-CM

## 2023-01-20 PROBLEM — C69.32: Status: ACTIVE | Noted: 2023-01-20

## 2023-01-20 PROCEDURE — 67800 REMOVE EYELID LESION: CPT | Performed by: OPHTHALMOLOGY

## 2023-01-20 PROCEDURE — 92285 EXTERNAL OCULAR PHOTOGRAPHY: CPT | Performed by: OPHTHALMOLOGY

## 2023-01-20 ASSESSMENT — VISUAL ACUITY
OD_BCVA: 20/70-2
OS_BCVA: 20/20-3

## 2023-01-20 ASSESSMENT — REFRACTION_CURRENTRX
OD_OVR_VA: 20/
OS_OVR_VA: 20/
OS_AXIS: 091
OD_VPRISM_DIRECTION: SV
OS_CYLINDER: SPHERE
OS_VPRISM_DIRECTION: SV
OD_CYLINDER: SPHERE
OS_CYLINDER: -1.00
OD_AXIS: 102
OD_VPRISM_DIRECTION: SV
OS_SPHERE: +1.25
OD_OVR_VA: 20/
OS_OVR_VA: 20/
OD_SPHERE: +2.25
OS_VPRISM_DIRECTION: SV
OD_CYLINDER: -1.00
OS_SPHERE: +2.25
OD_SPHERE: +1.25

## 2023-01-20 ASSESSMENT — SPHEQUIV_DERIVED
OS_SPHEQUIV: 2.375
OS_SPHEQUIV: -0.375
OD_SPHEQUIV: -0.125
OD_SPHEQUIV: 0.625

## 2023-01-20 ASSESSMENT — SUPERFICIAL PUNCTATE KERATITIS (SPK)
OS_SPK: T
OD_SPK: T

## 2023-01-20 ASSESSMENT — AXIALLENGTH_DERIVED
OS_AL: 22.5327
OS_AL: 23.5544
OD_AL: 23.2329
OD_AL: 22.953

## 2023-01-20 ASSESSMENT — REFRACTION_AUTOREFRACTION
OS_SPHERE: -0.25
OS_AXIS: 80
OD_AXIS: 101
OD_AXIS: 097
OD_CYLINDER: -0.25
OS_CYLINDER: -0.25
OD_SPHERE: +0.75
OD_CYLINDER: -0.25
OS_AXIS: 177
OD_SPHERE: 0.00
OS_SPHERE: +2.50
OS_CYLINDER: -0.25

## 2023-01-20 ASSESSMENT — KERATOMETRY
OS_K2POWER_DIOPTERS: 44.75
OD_AXISANGLE_DEGREES: 099
OS_K1POWER_DIOPTERS: 43.25
METHOD_AUTO_MANUAL: MANUAL
OS_AXISANGLE_DEGREES: 098
OD_K1POWER_DIOPTERS: 44.00
OD_K2POWER_DIOPTERS: 45.25

## 2023-01-20 ASSESSMENT — CONFRONTATIONAL VISUAL FIELD TEST (CVF)
OD_FINDINGS: FULL
OS_FINDINGS: FULL

## 2023-01-20 ASSESSMENT — LID EXAM ASSESSMENTS
OD_BLEPHARITIS: RUL
OS_BLEPHARITIS: LUL

## 2023-02-16 ENCOUNTER — OFFICE (OUTPATIENT)
Dept: URBAN - METROPOLITAN AREA CLINIC 100 | Facility: CLINIC | Age: 68
Setting detail: OPHTHALMOLOGY
End: 2023-02-16
Payer: COMMERCIAL

## 2023-02-16 DIAGNOSIS — G24.5: ICD-10-CM

## 2023-02-16 PROCEDURE — 64612 DESTROY NERVE FACE MUSCLE: CPT | Performed by: OPHTHALMOLOGY

## 2023-02-16 PROCEDURE — 99024 POSTOP FOLLOW-UP VISIT: CPT | Performed by: OPHTHALMOLOGY

## 2023-02-16 ASSESSMENT — KERATOMETRY
OS_AXISANGLE_DEGREES: 098
METHOD_AUTO_MANUAL: MANUAL
OD_K2POWER_DIOPTERS: 45.25
OS_K1POWER_DIOPTERS: 43.25
METHOD_AUTO_MANUAL: MANUAL
OS_K2POWER_DIOPTERS: 44.75
OD_K1POWER_DIOPTERS: 44.00
OS_AXISANGLE_DEGREES: 098
OS_K1POWER_DIOPTERS: 43.25
OD_AXISANGLE_DEGREES: 099
OD_K2POWER_DIOPTERS: 45.25
OD_AXISANGLE_DEGREES: 099
OS_K2POWER_DIOPTERS: 44.75
OD_K1POWER_DIOPTERS: 44.00

## 2023-02-16 ASSESSMENT — REFRACTION_AUTOREFRACTION
OD_SPHERE: +0.75
OD_CYLINDER: -0.25
OS_AXIS: 177
OS_SPHERE: +2.50
OD_AXIS: 097
OD_AXIS: 101
OS_SPHERE: +2.50
OS_SPHERE: -0.25
OD_CYLINDER: -0.25
OS_AXIS: 80
OD_SPHERE: 0.00
OS_AXIS: 80
OD_CYLINDER: -0.25
OD_CYLINDER: -0.25
OS_CYLINDER: -0.25
OS_AXIS: 177
OD_SPHERE: 0.00
OS_CYLINDER: -0.25
OD_SPHERE: +0.75
OS_CYLINDER: -0.25
OD_AXIS: 101
OS_CYLINDER: -0.25
OS_SPHERE: -0.25
OD_AXIS: 097

## 2023-02-16 ASSESSMENT — SUPERFICIAL PUNCTATE KERATITIS (SPK)
OS_SPK: T
OD_SPK: T

## 2023-02-16 ASSESSMENT — AXIALLENGTH_DERIVED
OD_AL: 23.2329
OS_AL: 22.5327
OS_AL: 23.5544
OS_AL: 22.5327
OD_AL: 22.953
OD_AL: 23.2329
OD_AL: 22.953
OS_AL: 23.5544

## 2023-02-16 ASSESSMENT — CONFRONTATIONAL VISUAL FIELD TEST (CVF)
OD_FINDINGS: FULL
OS_FINDINGS: FULL

## 2023-02-16 ASSESSMENT — LID EXAM ASSESSMENTS
OD_BLEPHARITIS: RUL
OS_BLEPHARITIS: LUL

## 2023-02-16 ASSESSMENT — SPHEQUIV_DERIVED
OD_SPHEQUIV: 0.625
OS_SPHEQUIV: 2.375
OD_SPHEQUIV: -0.125
OD_SPHEQUIV: 0.625
OD_SPHEQUIV: -0.125
OS_SPHEQUIV: 2.375
OS_SPHEQUIV: -0.375
OS_SPHEQUIV: -0.375

## 2023-02-16 ASSESSMENT — VISUAL ACUITY
OD_BCVA: 20/50-2
OS_BCVA: 20/25
OD_BCVA: 20/50-2
OS_BCVA: 20/25

## 2023-03-15 ENCOUNTER — LABORATORY RESULT (OUTPATIENT)
Age: 68
End: 2023-03-15

## 2023-03-19 LAB
25(OH)D3 SERPL-MCNC: 29.4 NG/ML
ALBUMIN SERPL ELPH-MCNC: 4.6 G/DL
ALP BLD-CCNC: 97 U/L
ALT SERPL-CCNC: 15 U/L
ANA PAT FLD IF-IMP: ABNORMAL
ANA SER IF-ACNC: ABNORMAL
ANION GAP SERPL CALC-SCNC: 12 MMOL/L
AST SERPL-CCNC: 19 U/L
BASOPHILS # BLD AUTO: 0.03 K/UL
BASOPHILS NFR BLD AUTO: 0.5 %
BILIRUB SERPL-MCNC: 0.3 MG/DL
BUN SERPL-MCNC: 21 MG/DL
C3 SERPL-MCNC: 136 MG/DL
C4 SERPL-MCNC: 27 MG/DL
CALCIUM SERPL-MCNC: 10.1 MG/DL
CALCIUM SERPL-MCNC: 10.1 MG/DL
CHLORIDE SERPL-SCNC: 105 MMOL/L
CHROMATIN AB SERPL-ACNC: <0.2 AL
CK SERPL-CCNC: 96 U/L
CO2 SERPL-SCNC: 27 MMOL/L
CREAT SERPL-MCNC: 0.76 MG/DL
CRP SERPL-MCNC: <3 MG/L
DSDNA AB SER-ACNC: <12 IU/ML
EGFR: 85 ML/MIN/1.73M2
ENA RNP AB SER IA-ACNC: 0.2 AL
ENA SM AB SER IA-ACNC: <0.2 AL
ENA SS-A AB SER IA-ACNC: 0.4 AL
ENA SS-B AB SER IA-ACNC: 0.4 AL
EOSINOPHIL # BLD AUTO: 0.15 K/UL
EOSINOPHIL NFR BLD AUTO: 2.3 %
ERYTHROCYTE [SEDIMENTATION RATE] IN BLOOD BY WESTERGREN METHOD: 12 MM/HR
GLUCOSE SERPL-MCNC: 76 MG/DL
HCT VFR BLD CALC: 43.8 %
HGB BLD-MCNC: 14 G/DL
IMM GRANULOCYTES NFR BLD AUTO: 0.3 %
LYMPHOCYTES # BLD AUTO: 1.94 K/UL
LYMPHOCYTES NFR BLD AUTO: 29.6 %
MAN DIFF?: NORMAL
MCHC RBC-ENTMCNC: 30.2 PG
MCHC RBC-ENTMCNC: 32 GM/DL
MCV RBC AUTO: 94.4 FL
MONOCYTES # BLD AUTO: 0.5 K/UL
MONOCYTES NFR BLD AUTO: 7.6 %
NEUTROPHILS # BLD AUTO: 3.91 K/UL
NEUTROPHILS NFR BLD AUTO: 59.7 %
PARATHYROID HORMONE INTACT: 32 PG/ML
PLATELET # BLD AUTO: 245 K/UL
POTASSIUM SERPL-SCNC: 4.2 MMOL/L
PROT SERPL-MCNC: 7.2 G/DL
RBC # BLD: 4.64 M/UL
RBC # FLD: 14.1 %
RHEUMATOID FACT SER QL: <10 IU/ML
SODIUM SERPL-SCNC: 144 MMOL/L
TSH SERPL-ACNC: 4.63 UIU/ML
WBC # FLD AUTO: 6.55 K/UL

## 2023-03-20 ENCOUNTER — NON-APPOINTMENT (OUTPATIENT)
Age: 68
End: 2023-03-20

## 2023-03-27 ENCOUNTER — RX RENEWAL (OUTPATIENT)
Age: 68
End: 2023-03-27

## 2023-04-06 ENCOUNTER — APPOINTMENT (OUTPATIENT)
Dept: RHEUMATOLOGY | Facility: CLINIC | Age: 68
End: 2023-04-06
Payer: COMMERCIAL

## 2023-04-06 VITALS
BODY MASS INDEX: 23.04 KG/M2 | SYSTOLIC BLOOD PRESSURE: 130 MMHG | WEIGHT: 130 LBS | HEART RATE: 73 BPM | HEIGHT: 63 IN | TEMPERATURE: 98 F | OXYGEN SATURATION: 96 % | DIASTOLIC BLOOD PRESSURE: 70 MMHG

## 2023-04-06 DIAGNOSIS — M19.041 PRIMARY OSTEOARTHRITIS, RIGHT HAND: ICD-10-CM

## 2023-04-06 DIAGNOSIS — M81.0 AGE-RELATED OSTEOPOROSIS W/OUT CURRENT PATHOLOGICAL FRACTURE: ICD-10-CM

## 2023-04-06 DIAGNOSIS — M32.9 SYSTEMIC LUPUS ERYTHEMATOSUS, UNSPECIFIED: ICD-10-CM

## 2023-04-06 DIAGNOSIS — M19.042 PRIMARY OSTEOARTHRITIS, RIGHT HAND: ICD-10-CM

## 2023-04-06 PROCEDURE — 99213 OFFICE O/P EST LOW 20 MIN: CPT

## 2023-04-08 NOTE — HISTORY OF PRESENT ILLNESS
[FreeTextEntry1] : 4/6/2023\par Ocular melanoma (NEW): \par Symptoms started in September with blurry vision in L eye only. Diagnosed after repeated visits, 2nd opinion. Had surgery at [facility] Dr. Martin in January 2023, had radiation disc placed x 4 days, went back to surgery to have disc removed. Still has blurry vision in L eye but greatly improved. Will get laser therapy in May and August. MRI of head every 6 months, watchful waiting. No systemic daily treatment. \par \par SLE:\par Endorses weight gain, frustrated by this, up 7 pounds. Cannot get below 130#.  Doesn't feel good at this weight. Occasional joint pain at times, maybe occurs once a month. Feels like raynauds is back, hands cold and will turn white. Hair is falling out more, but hasn't seen patches or receding hair line. Had COVID in November - 3 weeks of coughing, stuffy nose, - intermittent WILKINS, no brain fog. Has been told by other providers she may have long covid.\par Otherwise ROS neg  \par \par Osteoporosis:\par Did complete bone density believes in summer 2022. No falls or fractures since then (need report). \par \par Otherwise doing well despite weight gain. Denies fever, chills, nasal/oral sores, sob, pleuritic pain, chest pain, palpitations, abdominal pain, n/v/d.\par \par ______________________________________________\par 10/1/21 \par FIRST VISIT WITH ME.. Adrian from Dr Sinha\par - Dx with SLE ys ago presented with + serologies and diffuse arthralgias/ fatigue.  In past on high dose HCQ 50 mg, tapered off over past few ys without return of sx \par - OP:  qoy Reclast, not tolerated well severe bony pain... doesn't want to continue \par \par \par 1) SLE\par 2) CAD\par 3) OP\par 4) OA primarily hands \par 5) FM mild \par \par ____________________________________________________________________________\par f/u visit\par 65 year old female well known to me for many years. She has a diagnosis of SLE, osteopenia and hand OA \par as far as her lupus is concerned she was diagnosed prior to seeing me, she has had minor symptoms mostly with positive serologies and negative APL antibodies. She has been doing well and her SLE has been quiescent for the past few years and I have been able to wean her down on the plaquenil from 500 mg to now off. She has done  very well without PLq.\par She denies alopecia, oral ulcers, sicca symptoms, or Raynauds. Her joint pains have been stable, she rates it at a 1/10, with some fatigue. She continues to work.\par She has osteopenia and has been reclast q 2 years. \par  she had a CT chest that as per pt showed spots of mucus in her lungs. she was given a ? incentive spirometer by pul, states that she had no change for 2 months. She also saw allergy, and was treated with abx and is better now\par She has a good appetite and denies wt loss. She denies fevers, chills or night sweats. She is uptodate on her age appropriate screens.

## 2023-04-08 NOTE — REVIEW OF SYSTEMS
[SOB on Exertion] : shortness of breath during exertion [Arthralgias] : arthralgias [Negative] : Heme/Lymph [Recent Weight Gain (___ Lbs)] : recent [unfilled] ~Ulb weight gain [Eyesight Problems] : eyesight problems [As Noted in HPI] : as noted in HPI [Fever] : no fever [Chills] : no chills [Feeling Poorly] : not feeling poorly [Feeling Tired] : not feeling tired [Eye Pain] : no eye pain [Red Eyes] : eyes not red [Dry Eyes] : no dryness of the eyes [Loss Of Hearing] : no hearing loss [Chest Pain] : no chest pain [Palpitations] : no palpitations [Shortness Of Breath] : no shortness of breath [Cough] : no cough [Orthopnea] : no orthopnea [PND] : no PND [Abdominal Pain] : no abdominal pain [Heartburn] : no heartburn [Dysuria] : no dysuria [Joint Pain] : no joint pain [Joint Swelling] : no joint swelling [Joint Stiffness] : no joint stiffness [Skin Lesions] : no skin lesions [Limb Weakness] : no limb weakness [Difficulty Walking] : no difficulty walking [Anxiety] : no anxiety [Sleep Disturbances] : no sleep disturbances [Depression] : no depression [Muscle Weakness] : no muscle weakness [Easy Bruising] : no tendency for easy bruising [FreeTextEntry2] : Frustrated w/ inability to lose wt  [FreeTextEntry3] : L eye blurry vision 2/2 ocular melanoma sx  [FreeTextEntry6] :  mild slowly improving follows w/ Dr Chen, PFTs nl 2021, not repeated since COVID  [FreeTextEntry9] : nothing recent except hands - mild CMC and R 5 PIP, hands swell when walking  [de-identified] : hair loss but no patches, no receding hair line

## 2023-04-08 NOTE — PHYSICAL EXAM
[General Appearance - Alert] : alert [General Appearance - In No Acute Distress] : in no acute distress [General Appearance - Well Nourished] : well nourished [General Appearance - Well Developed] : well developed [General Appearance - Well-Appearing] : healthy appearing [Sclera] : the sclera and conjunctiva were normal [PERRL With Normal Accommodation] : pupils were equal in size, round, and reactive to light [Extraocular Movements] : extraocular movements were intact [Outer Ear] : the ears and nose were normal in appearance [Nasal Cavity] : the nasal mucosa and septum were normal [Oropharynx] : the oropharynx was normal [Neck Appearance] : the appearance of the neck was normal [Jugular Venous Distention Increased] : there was no jugular-venous distention [Neck Cervical Mass (___cm)] : no neck mass was observed [Respiration, Rhythm And Depth] : normal respiratory rhythm and effort [Auscultation Breath Sounds / Voice Sounds] : lungs were clear to auscultation bilaterally [Heart Rate And Rhythm] : heart rate was normal and rhythm regular [Heart Sounds] : normal S1 and S2 [Murmurs] : no murmurs [Heart Sounds Gallop] : no gallops [Heart Sounds Pericardial Friction Rub] : no pericardial rub [Edema] : there was no peripheral edema [Cervical Lymph Nodes Enlarged Posterior Bilaterally] : posterior cervical [Cervical Lymph Nodes Enlarged Anterior Bilaterally] : anterior cervical [Supraclavicular Lymph Nodes Enlarged Bilaterally] : supraclavicular [No CVA Tenderness] : no ~M costovertebral angle tenderness [No Spinal Tenderness] : no spinal tenderness [Abnormal Walk] : normal gait [Nail Clubbing] : no clubbing  or cyanosis of the fingernails [Musculoskeletal - Swelling] : no joint swelling seen [Motor Tone] : muscle strength and tone were normal [Skin Color & Pigmentation] : normal skin color and pigmentation [Skin Turgor] : normal skin turgor [Motor Exam] : the motor exam was normal [Oriented To Time, Place, And Person] : oriented to person, place, and time [Impaired Insight] : insight and judgment were intact [Affect] : the affect was normal [] : no respiratory distress [Exaggerated Use Of Accessory Muscles For Inspiration] : no accessory muscle use [FreeTextEntry1] : FROM all joints, no tender points noted, hands with OA changes, with Heberdens nodes and CMC squaring- most uncomfortable today at R 5 PIP.. bony change not ttt (but h/o R5 being most painful)

## 2023-04-08 NOTE — ASSESSMENT
[FreeTextEntry1] : 67 year old female She has a diagnosis of SLE, CAD s/p 4 STENTs, hx bronchiectasis (resolved), New Ocular Melanoma s/p tumor resection at [facility] [place] (in remission with close follow up) osteopenia and hand OA presents for routine management today. \par \par R eye spasticity + response to BOtox routinely \par \par \par 1) SLE- PAMELLA 1:160S serologies neg past few ys (? full profile unknown).. w/ nl C3/4 and no proteinuria even off tx.  Minimal systemic c/o now or in past few ys.. tapered off  HCQ from high of 500 mg HCQ completely off 2019... no return of sx.  updated serologies 9/21 neg-> repeat 3/23 still neg \par Previous WILKINS w/u + CAD (see below) and mild bronchiectasis \par Her review of systems continues to be negative  still today  \par \par 2) Muscle spasm/ secondary FMS-PRN use Tizanidine when severe, nothing recent but c/w amitrip 10-20 mg with + response initially, now reports rarely effective.  Switched 4/22 to cyclobenzaprine w/ + response now only used PRN.. ,  reserving tizanidine only for severe spasticity\par \par 3) Osteoporosis:  Last DExa 7/23/20 w/ most severe T score -2.7 at L1 and -2.4 at LFN w/ frax 11/ 2.4%... and essentially stable over past 4 ys on QOY Reclast, but hates bony pain experienced for several wks after each  Continues w/  calcium and vitamin D at 1200 mg qd. \par -repeated DEXA 7/2022 - results requested from Paula in [place]\par -Will treat as appropriate once Dexa results known \par -Denies falls/fractures \par \par 4) Hand OA-\par She has underlying OA and has had steroid injections in her 5th PIP in the past on the right hand and b/l CMC.. not severe at this time. \par -Imaging from 4/2022 did not show degenerative changes despite physical exam and hand appearance\par \par 5) CAD:  initially presented with SOB/ WILKINS.. dramatic improvement with 5 stents.. again recommend possible low dose HCQ to prevent low grade inflammation associated w/ SLE but at this time little evidence of SLE for several ys off therapy. Unclear if SLE was every + and  cardiologist did not feel this was necessary . \par -Stable today and denies worsening SOB, CP, or palpitations. Follows q6m with cardiology \par \par 6) Chronic intermittent Cough- non productive and not associated w/ SOB..\par -Ct chest reviewed, ANCAs negative with allergy, follows closely with Dr. Chen- dx bronchiectasis.  No tx needed at this time, has not had PFTs since COVID but progressively improved after 3 wks resp symptoms... if sx worsen should return to pulm\par \par 7) NEW Ocular Melanoma (dx 12/2022) s/p Tumor resection at [facility] [place] (1/2023)\par -Doing well overall, no evidence of metastatic spread and has routine brain MR q6m \par \par 8) Long covid vs metabolic derangement vs sarcopenia \par Dx with COVID in November, mild case but since then has gained weight, worsening fatigue, and some hair loss\par -7lb weight gain despite calorie restriction and staying active\par -Educated patient on effects of aging on metabolism and need for increase strength work outs\par -If worsens may benefit from Endocrinology or nutritionist referral \par \par Plan: \par - no change in tx at this point ... could consider 200 mg qod HCQ for CV protection but not necessary at this point.. , but doesn't sound like SLE was ever severe- ? not sure why 500 mg HCQ used??- unclear\par \par - Need Dexa results, will treat as appropriate...  If we use Reclast would premed w/ steroids night before and night of.. only had 1 dose thus far\par \par - Continue cyclobenzaprine PRN \par \par - She is aware to call if her symptoms worsen. f/u in 6-12 m

## 2023-04-09 ENCOUNTER — NON-APPOINTMENT (OUTPATIENT)
Age: 68
End: 2023-04-09

## 2023-04-18 ENCOUNTER — NON-APPOINTMENT (OUTPATIENT)
Age: 68
End: 2023-04-18

## 2023-04-28 ENCOUNTER — NON-APPOINTMENT (OUTPATIENT)
Age: 68
End: 2023-04-28

## 2023-06-01 ENCOUNTER — OFFICE (OUTPATIENT)
Dept: URBAN - METROPOLITAN AREA CLINIC 100 | Facility: CLINIC | Age: 68
Setting detail: OPHTHALMOLOGY
End: 2023-06-01
Payer: COMMERCIAL

## 2023-06-01 DIAGNOSIS — G51.31: ICD-10-CM

## 2023-06-01 PROCEDURE — 64612 DESTROY NERVE FACE MUSCLE: CPT | Performed by: OPHTHALMOLOGY

## 2023-06-01 PROCEDURE — 99024 POSTOP FOLLOW-UP VISIT: CPT | Performed by: OPHTHALMOLOGY

## 2023-06-01 ASSESSMENT — SPHEQUIV_DERIVED
OD_SPHEQUIV: 0.625
OD_SPHEQUIV: -0.125
OS_SPHEQUIV: 2.375
OS_SPHEQUIV: -0.375
OD_SPHEQUIV: 0.625
OS_SPHEQUIV: 2.375
OD_SPHEQUIV: -0.125
OS_SPHEQUIV: -0.375

## 2023-06-01 ASSESSMENT — LID EXAM ASSESSMENTS
OS_BLEPHARITIS: LUL
OD_BLEPHARITIS: RUL

## 2023-06-01 ASSESSMENT — REFRACTION_AUTOREFRACTION
OS_AXIS: 177
OS_SPHERE: -0.25
OS_SPHERE: +2.50
OS_CYLINDER: -0.25
OD_AXIS: 101
OS_CYLINDER: -0.25
OD_AXIS: 101
OD_AXIS: 097
OD_CYLINDER: -0.25
OD_CYLINDER: -0.25
OS_AXIS: 80
OS_AXIS: 177
OD_CYLINDER: -0.25
OD_SPHERE: +0.75
OD_CYLINDER: -0.25
OS_CYLINDER: -0.25
OS_SPHERE: +2.50
OS_CYLINDER: -0.25
OD_SPHERE: 0.00
OS_AXIS: 80
OD_SPHERE: 0.00
OS_SPHERE: -0.25
OD_AXIS: 097
OD_SPHERE: +0.75

## 2023-06-01 ASSESSMENT — AXIALLENGTH_DERIVED
OD_AL: 22.953
OD_AL: 23.2329
OD_AL: 22.953
OS_AL: 23.5544
OD_AL: 23.2329
OS_AL: 22.5327
OS_AL: 22.5327
OS_AL: 23.5544

## 2023-06-01 ASSESSMENT — VISUAL ACUITY
OS_BCVA: 20/20-2
OS_BCVA: 20/20-2

## 2023-06-01 ASSESSMENT — KERATOMETRY
OD_K2POWER_DIOPTERS: 45.25
OS_K2POWER_DIOPTERS: 44.75
METHOD_AUTO_MANUAL: MANUAL
OS_K1POWER_DIOPTERS: 43.25
OS_AXISANGLE_DEGREES: 098
OD_K2POWER_DIOPTERS: 45.25
OS_K1POWER_DIOPTERS: 43.25
METHOD_AUTO_MANUAL: MANUAL
OS_AXISANGLE_DEGREES: 098
OS_K2POWER_DIOPTERS: 44.75
OD_AXISANGLE_DEGREES: 099
OD_K1POWER_DIOPTERS: 44.00
OD_AXISANGLE_DEGREES: 099
OD_K1POWER_DIOPTERS: 44.00

## 2023-06-01 ASSESSMENT — CONFRONTATIONAL VISUAL FIELD TEST (CVF)
OS_FINDINGS: FULL
OD_FINDINGS: FULL

## 2023-06-01 ASSESSMENT — SUPERFICIAL PUNCTATE KERATITIS (SPK)
OD_SPK: T
OS_SPK: T

## 2023-08-16 RX ORDER — PREDNISONE 5 MG/1
5 TABLET ORAL
Qty: 120 | Refills: 0 | Status: ACTIVE | COMMUNITY
Start: 2023-08-16 | End: 1900-01-01

## 2023-08-22 ENCOUNTER — APPOINTMENT (OUTPATIENT)
Dept: RHEUMATOLOGY | Facility: CLINIC | Age: 68
End: 2023-08-22
Payer: COMMERCIAL

## 2023-08-22 VITALS
WEIGHT: 131 LBS | SYSTOLIC BLOOD PRESSURE: 112 MMHG | HEART RATE: 89 BPM | DIASTOLIC BLOOD PRESSURE: 70 MMHG | OXYGEN SATURATION: 98 % | BODY MASS INDEX: 23.21 KG/M2 | HEIGHT: 63 IN | TEMPERATURE: 98.2 F

## 2023-08-22 PROCEDURE — 99213 OFFICE O/P EST LOW 20 MIN: CPT | Mod: 25

## 2023-08-22 PROCEDURE — 20600 DRAIN/INJ JOINT/BURSA W/O US: CPT | Mod: RT

## 2023-08-22 RX ORDER — TRIAMCINOLONE ACETONIDE 80 MG/ML
80 INJECTION, SUSPENSION INTRA-ARTICULAR; INTRAMUSCULAR
Qty: 1 | Refills: 0 | Status: COMPLETED | OUTPATIENT
Start: 2023-08-22

## 2023-08-22 RX ADMIN — TRIAMCINOLONE ACETONIDE 0 MG/ML: 80 INJECTION, SUSPENSION INTRA-ARTICULAR; INTRAMUSCULAR at 00:00

## 2023-08-22 NOTE — PROCEDURE
[Today's Date:] : Date: [unfilled] [Patient] : the patient [Risks] : risks [Benefits] : benefits [Alternatives] : alternatives [Consent Obtained] : written consent was obtained prior to the procedure and is detailed in the patient's record [Therapeutic] : therapeutic [#1 Site: ______] : #1 site identified in the [unfilled] [Ethyl Chloride] : ethyl chloride [Betadine] : betadine solution [Alcohol] : alcohol [___ml Steriod Preparation] : [unfilled] ml of steriod preparation  [Tolerated Well] : the patient tolerated the procedure well [No Complications] : there were no complications [Instructions Given] : handouts/patient instructions were given to patient [Patient Instructed to Call] : patient was instructed to call if redness at site, a decrease in range of motion or an increase in pain is noted after procedure. [de-identified] : 27 g 5/8" [de-identified] : 0.5 ml given - 40 mg

## 2023-08-22 NOTE — ASSESSMENT
[FreeTextEntry1] : 67 year old female She has a diagnosis of SLE, CAD s/p 4 STENTs, hx bronchiectasis (resolved), New Ocular Melanoma s/p tumor resection at [facility] [place] (in remission with close follow up) osteopenia and hand OA presents for routine management today.  - R eye spasticity + response to BOtox routinely + response   S/ CC:  R 5 PIP sudden onset pain/ swelling in PIP joint- x 5 days minimal improvement with steroids orally.. no redness / fever or constitutional sx...  Still No personal or FH psoriasis, AS, inflammatory back, bowel or eye dz Denies mucositis, keratoconjunctivitis/ oral / vaginal dryness, raynauds, serositis (no cardiopulmonary, HSM), no renal dysfunction, rash, alopecia, cytopenias, bleeding or clotting dyscrasias   PE:  R 5 PIP overt swelling/ pain with ttt marked- c/w dactylitis    1) SLE- PAMELLA 1:160S serologies neg past few ys (? full profile unknown).. w/ nl C3/4 and no proteinuria even off tx.  Minimal systemic c/o now or in past few ys.. tapered off  HCQ from high of 500 mg HCQ completely off 2019... no return of sx.  updated serologies 9/21 neg-> repeat 3/23 still neg  Previous WILKINS w/u + CAD (see below) and mild bronchiectasis  Her review of systems continues to be negative  still today    2) Muscle spasm/ secondary FMS-PRN use Tizanidine when severe, nothing recent but c/w amitrip 10-20 mg with + response initially, now reports rarely effective.  Switched 4/22 to cyclobenzaprine w/ + response now only used PRN.. ,  reserving tizanidine only for severe spasticity  3) Osteoporosis:  Last DExa 7/23/20 w/ most severe T score -2.7 at L1 and -2.4 at LFN w/ frax 11/ 2.4%... and essentially stable over past 4 ys on QOY Reclast, but hates bony pain experienced for several wks after each  Continues w/  calcium and vitamin D at 1200 mg qd.  -repeated DEXA 7/2022 - results requested from Paula in [place] -Will treat as appropriate once Dexa results known  -Denies falls/fractures   4) Hand OA vs inflammatory arthropathy - - NEW R 5 PIP swollen/ painful- similar episode last year + response to steroid injection.. repeat course.. 40 mg kenalog given.. advised to immobilize for next few days.. If needed will get MR but not necessary at this time. Needs updated labs, advised to go immediately in am.   She has underlying OA and has had steroid injections in her 5th PIP in the past on the right hand and b/l CMC.. not severe at this time.  -Imaging from 4/2022 did not show degenerative changes despite physical exam and hand appearance  5) CAD:  initially presented with SOB/ WILKINS.. dramatic improvement with 5 stents.. again recommend possible low dose HCQ to prevent low grade inflammation associated w/ SLE but at this time little evidence of SLE for several ys off therapy. Unclear if SLE was every + and  cardiologist did not feel this was necessary .  -Stable today and denies worsening SOB, CP, or palpitations. Follows q6m with cardiology   6) Chronic intermittent Cough- non productive and not associated w/ SOB..nothing at this time  -Ct chest reviewed, ANCAs negative with allergy, follows closely with Dr. Chen- dx bronchiectasis.  No tx needed at this time, has not had PFTs since COVID but progressively improved after 3 wks resp symptoms... if sx worsen should return to pulm  7) Ocular Melanoma (dx 12/2022) s/p Tumor resection at [facility] [place] (1/2023) -Doing well overall, no evidence of metastatic spread and has routine brain MR q6m   8) Long covid vs metabolic derangement vs sarcopenia - not active at this time Dx with COVID in November, mild case but since then has gained weight, worsening fatigue, and some hair loss -7lb weight gain despite calorie restriction and staying active -Educated patient on effects of aging on metabolism and need for increase strength work outs -If worsens may benefit from Endocrinology or nutritionist referral   Plan:  - IACS small joint R 5 PIP will 40 mg kenalog.. 2nd episode of dactylitis   - no change in tx at this point ... could consider 200 mg qod HCQ for CV protection but not necessary at this point..  , but doesn't sound like SLE was ever severe- ? not sure why 500 mg HCQ used??- unclear  - Need Dexa results, will treat as appropriate...  If we use Reclast would premed w/ steroids night before and night of.. only had 1 dose thus far  - Continue cyclobenzaprine PRN   - She is aware to call if her symptoms worsen. f/u in 6-12 m

## 2023-08-23 ENCOUNTER — LABORATORY RESULT (OUTPATIENT)
Age: 68
End: 2023-08-23

## 2023-09-13 ENCOUNTER — RX RENEWAL (OUTPATIENT)
Age: 68
End: 2023-09-13

## 2023-09-14 RX ORDER — VALACYCLOVIR 500 MG/1
500 TABLET, FILM COATED ORAL DAILY
Qty: 90 | Refills: 4 | Status: ACTIVE | COMMUNITY
Start: 2017-01-19 | End: 1900-01-01

## 2023-09-18 ENCOUNTER — OFFICE (OUTPATIENT)
Dept: URBAN - METROPOLITAN AREA CLINIC 100 | Facility: CLINIC | Age: 68
Setting detail: OPHTHALMOLOGY
End: 2023-09-18
Payer: COMMERCIAL

## 2023-09-18 DIAGNOSIS — G24.5: ICD-10-CM

## 2023-09-18 PROCEDURE — 99024 POSTOP FOLLOW-UP VISIT: CPT | Performed by: OPHTHALMOLOGY

## 2023-09-18 PROCEDURE — 64612 DESTROY NERVE FACE MUSCLE: CPT | Performed by: OPHTHALMOLOGY

## 2023-09-18 ASSESSMENT — REFRACTION_AUTOREFRACTION
OD_AXIS: 101
OS_AXIS: 177
OS_SPHERE: -0.25
OS_CYLINDER: -0.25
OD_CYLINDER: -0.25
OD_SPHERE: +0.75
OS_SPHERE: -0.25
OD_SPHERE: 0.00
OD_AXIS: 101
OD_SPHERE: +0.75
OD_CYLINDER: -0.25
OS_SPHERE: +2.50
OS_CYLINDER: -0.25
OD_CYLINDER: -0.25
OS_AXIS: 80
OS_CYLINDER: -0.25
OS_SPHERE: +2.50
OD_AXIS: 097
OD_SPHERE: 0.00
OS_AXIS: 177
OD_AXIS: 097
OS_CYLINDER: -0.25
OD_CYLINDER: -0.25
OS_AXIS: 80

## 2023-09-18 ASSESSMENT — AXIALLENGTH_DERIVED
OS_AL: 22.5327
OS_AL: 22.5327
OD_AL: 22.953
OD_AL: 23.2329
OD_AL: 23.2329
OS_AL: 23.5544
OD_AL: 22.953
OS_AL: 23.5544

## 2023-09-18 ASSESSMENT — VISUAL ACUITY
OS_BCVA: 20/20-2
OD_BCVA: 20/40
OD_BCVA: 20/40
OS_BCVA: 20/20-2

## 2023-09-18 ASSESSMENT — KERATOMETRY
OS_K2POWER_DIOPTERS: 44.75
OS_K1POWER_DIOPTERS: 43.25
OS_K2POWER_DIOPTERS: 44.75
OD_K1POWER_DIOPTERS: 44.00
OD_K2POWER_DIOPTERS: 45.25
METHOD_AUTO_MANUAL: MANUAL
OD_K1POWER_DIOPTERS: 44.00
OS_AXISANGLE_DEGREES: 098
OD_AXISANGLE_DEGREES: 099
METHOD_AUTO_MANUAL: MANUAL
OD_AXISANGLE_DEGREES: 099
OS_K1POWER_DIOPTERS: 43.25
OS_AXISANGLE_DEGREES: 098
OD_K2POWER_DIOPTERS: 45.25

## 2023-09-18 ASSESSMENT — SPHEQUIV_DERIVED
OD_SPHEQUIV: 0.625
OS_SPHEQUIV: -0.375
OS_SPHEQUIV: 2.375
OS_SPHEQUIV: -0.375
OD_SPHEQUIV: -0.125
OS_SPHEQUIV: 2.375
OD_SPHEQUIV: -0.125
OD_SPHEQUIV: 0.625

## 2023-09-18 ASSESSMENT — CONFRONTATIONAL VISUAL FIELD TEST (CVF)
OS_FINDINGS: FULL
OD_FINDINGS: FULL

## 2023-10-11 ENCOUNTER — NON-APPOINTMENT (OUTPATIENT)
Age: 68
End: 2023-10-11

## 2023-11-09 ENCOUNTER — APPOINTMENT (OUTPATIENT)
Dept: ORTHOPEDIC SURGERY | Facility: CLINIC | Age: 68
End: 2023-11-09
Payer: COMMERCIAL

## 2023-11-09 VITALS — WEIGHT: 133 LBS | HEIGHT: 63 IN | BODY MASS INDEX: 23.57 KG/M2

## 2023-11-09 DIAGNOSIS — M50.322 OTHER CERVICAL DISC DEGENERATION AT C5-C6 LEVEL: ICD-10-CM

## 2023-11-09 DIAGNOSIS — S13.9XXA SPRAIN OF JOINTS AND LIGAMENTS OF UNSPECIFIED PARTS OF NECK, INITIAL ENCOUNTER: ICD-10-CM

## 2023-11-09 DIAGNOSIS — M50.321 OTHER CERVICAL DISC DEGENERATION AT C4-C5 LEVEL: ICD-10-CM

## 2023-11-09 PROCEDURE — 99203 OFFICE O/P NEW LOW 30 MIN: CPT

## 2023-11-09 PROCEDURE — 72040 X-RAY EXAM NECK SPINE 2-3 VW: CPT

## 2023-11-09 RX ORDER — METHYLPREDNISOLONE 4 MG/1
4 TABLET ORAL
Qty: 1 | Refills: 1 | Status: ACTIVE | COMMUNITY
Start: 2023-11-09 | End: 1900-01-01

## 2023-12-18 ENCOUNTER — OFFICE (OUTPATIENT)
Dept: URBAN - METROPOLITAN AREA CLINIC 100 | Facility: CLINIC | Age: 68
Setting detail: OPHTHALMOLOGY
End: 2023-12-18
Payer: COMMERCIAL

## 2023-12-18 DIAGNOSIS — G51.31: ICD-10-CM

## 2023-12-18 PROCEDURE — 99024 POSTOP FOLLOW-UP VISIT: CPT | Performed by: OPHTHALMOLOGY

## 2023-12-18 PROCEDURE — 64612 DESTROY NERVE FACE MUSCLE: CPT | Mod: RT | Performed by: OPHTHALMOLOGY

## 2023-12-18 ASSESSMENT — REFRACTION_AUTOREFRACTION
OD_AXIS: 097
OD_AXIS: 101
OD_SPHERE: +0.75
OS_SPHERE: -0.25
OD_CYLINDER: -0.25
OS_AXIS: 80
OS_CYLINDER: -0.25
OD_SPHERE: +0.75
OS_SPHERE: +2.50
OS_CYLINDER: -0.25
OD_SPHERE: 0.00
OD_SPHERE: 0.00
OS_CYLINDER: -0.25
OS_AXIS: 177
OD_AXIS: 101
OS_SPHERE: +2.50
OD_CYLINDER: -0.25
OS_CYLINDER: -0.25
OD_CYLINDER: -0.25
OS_SPHERE: -0.25
OD_AXIS: 097
OS_AXIS: 80
OS_AXIS: 177
OD_CYLINDER: -0.25

## 2023-12-18 ASSESSMENT — SPHEQUIV_DERIVED
OS_SPHEQUIV: -0.375
OS_SPHEQUIV: 2.375
OD_SPHEQUIV: 0.625
OS_SPHEQUIV: -0.375
OD_SPHEQUIV: -0.125
OS_SPHEQUIV: 2.375
OD_SPHEQUIV: -0.125
OD_SPHEQUIV: 0.625

## 2023-12-18 ASSESSMENT — CONFRONTATIONAL VISUAL FIELD TEST (CVF)
OD_FINDINGS: FULL
OS_FINDINGS: FULL

## 2023-12-18 ASSESSMENT — SUPERFICIAL PUNCTATE KERATITIS (SPK)
OS_SPK: T
OD_SPK: T

## 2023-12-18 ASSESSMENT — LID EXAM ASSESSMENTS
OD_BLEPHARITIS: RUL
OS_BLEPHARITIS: LUL

## 2024-01-18 ENCOUNTER — APPOINTMENT (OUTPATIENT)
Dept: PEDIATRIC ALLERGY IMMUNOLOGY | Facility: CLINIC | Age: 69
End: 2024-01-18
Payer: COMMERCIAL

## 2024-01-18 ENCOUNTER — NON-APPOINTMENT (OUTPATIENT)
Age: 69
End: 2024-01-18

## 2024-01-18 VITALS
DIASTOLIC BLOOD PRESSURE: 83 MMHG | OXYGEN SATURATION: 94 % | TEMPERATURE: 98.2 F | SYSTOLIC BLOOD PRESSURE: 132 MMHG | WEIGHT: 133 LBS | HEART RATE: 84 BPM | HEIGHT: 63 IN | BODY MASS INDEX: 23.57 KG/M2

## 2024-01-18 DIAGNOSIS — Z85.840 PERSONAL HISTORY OF MALIGNANT NEOPLASM OF EYE: ICD-10-CM

## 2024-01-18 DIAGNOSIS — J01.90 ACUTE SINUSITIS, UNSPECIFIED: ICD-10-CM

## 2024-01-18 DIAGNOSIS — R05.3 CHRONIC COUGH: ICD-10-CM

## 2024-01-18 PROCEDURE — 99204 OFFICE O/P NEW MOD 45 MIN: CPT | Mod: 25

## 2024-01-18 PROCEDURE — 94010 BREATHING CAPACITY TEST: CPT

## 2024-01-18 RX ORDER — AMOXICILLIN AND CLAVULANATE POTASSIUM 875; 125 MG/1; MG/1
875-125 TABLET, COATED ORAL
Qty: 28 | Refills: 0 | Status: ACTIVE | COMMUNITY
Start: 2024-01-18 | End: 1900-01-01

## 2024-01-18 RX ORDER — FLUTICASONE PROPIONATE 50 UG/1
50 SPRAY, METERED NASAL TWICE DAILY
Qty: 1 | Refills: 11 | Status: ACTIVE | COMMUNITY
Start: 2024-01-18 | End: 1900-01-01

## 2024-01-18 RX ORDER — ALBUTEROL SULFATE 90 UG/1
108 (90 BASE) INHALANT RESPIRATORY (INHALATION)
Qty: 1 | Refills: 5 | Status: ACTIVE | COMMUNITY
Start: 2024-01-18 | End: 1900-01-01

## 2024-01-18 RX ORDER — AZELASTINE HYDROCHLORIDE 137 UG/1
0.1 SPRAY, METERED NASAL TWICE DAILY
Qty: 1 | Refills: 10 | Status: ACTIVE | COMMUNITY
Start: 2024-01-18 | End: 1900-01-01

## 2024-01-18 RX ORDER — LEVALBUTEROL TARTRATE 45 UG/1
45 AEROSOL, METERED ORAL
Qty: 1 | Refills: 5 | Status: ACTIVE | COMMUNITY
Start: 2024-01-18 | End: 1900-01-01

## 2024-01-18 NOTE — SOCIAL HISTORY
[de-identified] : House with gas baseboard heating, central air conditioning, 3 dogs, no cigarette smoke exposure, no carpet in bedroom.

## 2024-01-18 NOTE — IMPRESSION
[Spirometry] : Spirometry [Normal Spirometry] : spirometry normal [FreeTextEntry1] : Spirometry low normal, slightly increased from 2020.

## 2024-01-18 NOTE — HISTORY OF PRESENT ILLNESS
[de-identified] : In office for acute symptoms.  Returning patient to our office.  Symptoms for 1 month duration consisting of stuffy nose, postnasal drip, cough but no fever or discolored mucus.  Seen in urgent care, tested negative for influenza and COVID-19.  Received Medrol Dosepak and antibiotic, possibly Keflex for 1 week, symptoms improved but did not clear while taking medications and increased again when the medications were finished.  Used Flonase briefly.  Homeopathic remedies did not help, recommended by her pharmacist.  Cough syrup did not help either.  Did not use inhalers, antihistamines or nasal sprays.  Watches her [facility]  Patient got COVID-19 after watching them earlier last year.  She had fatigue and stuffy nose but no significant chest symptoms.  She had no other antibiotics in the last 12 months.  Followed in our office from 1/4/2008 for chronic stuffy nose and history of recurrent sinus infections when younger, subsequently returned on 5/8/2015.  Received Pneumovax 23 on 12/29/2017 and Hib vaccine on 10/30/2020, with good response to both.  Spirometry 1 1/17/2020 showed mild restriction.  Skin testing to seafood was negative in 2015.  Skin testing to environmental allergens was negative in 2008.   History of immunosuppressive treatment for lupus in the past.  Had hypertension for many years, heart ablation 3 years ago, 4 stents placed 2 years ago, and acid reflux.  Diagnosed with ocular melanoma, had surgery on the left eye in January 2023 with some radiation.  Current medications: Valtrex, metoprolol, baby aspirin, probiotic, Crestor, vitamin K2 D3, calcium, multivitamin, pantoprazole. Spirometry on 1/17/2020 showed mild restriction.  CT of the chest in 2021 requested by rheumatologist showed mild bronchiectasis/linear atelectasis in lingula, scattered granulomas calcified, right middle lobe subpleural 5 mm opacity, lingula 4 mm nodule, splenic calcification.  Followed by pulmonary at the time for these findings.

## 2024-01-18 NOTE — PHYSICAL EXAM
[Alert] : alert [No Acute Distress] : no acute distress [Normal Voice/Communication] : normal voice communication [Supple] : the neck was supple [Normal S1, S2] : normal S1 and S2 [Regular Rhythm] : with a regular rhythm [Soft] : abdomen soft [Not Tender] : non-tender [Not Distended] : not distended [No HSM] : no hepato-splenomegaly [Normal Cervical Lymph Nodes] : cervical [Skin Intact] : skin intact  [No Rash] : no rash [No clubbing] : no clubbing [No Cyanosis] : no cyanosis [Alert, Awake, Oriented as Age-Appropriate] : alert, awake, oriented as age appropriate [de-identified] : Occasional tight cough. [de-identified] : Eyes clear. [de-identified] : Throat clear. Nasal mucosa pink, mild bilateral stuffiness, scant clear discharge on right, yellow crusting on left. No sinus tenderness. [de-identified] : Chest good air entry, no crackles, slight end-expiratory wheeze right posterior lung field.

## 2024-01-18 NOTE — REVIEW OF SYSTEMS
[Nasal Congestion] : nasal congestion [Post Nasal Drip] : post nasal drip [Cough] : cough [Recurrent Sinus Infections] : no recurrent sinus infections [Recurrent Throat Infections] : no recurrence of throat infections [Recurrent Ear Infections] : no recurrence or ear infections [Recurrent Skin Infections] : no recurrent skin infections [Recurrent Pneumonia] : no ~T recurrent pneumonia

## 2024-01-18 NOTE — ASSESSMENT
[FreeTextEntry1] : Acute sinusitis: Augmentin 875 mg twice daily for 2 weeks.  Flonase and Astelin, 1 spray to each nostril twice daily from both.  If not better in 3 to 5 days, call, will prescribe short course of prednisone. Cough/slight wheezing: Spirometry normal today.  Levalbuterol 2 puffs twice daily until cough clears. Follow-up in 3 weeks. Patient called after the visit, levalbuterol HFA not covered by plan, changed to albuterol.

## 2024-02-14 ENCOUNTER — NON-APPOINTMENT (OUTPATIENT)
Age: 69
End: 2024-02-14

## 2024-02-19 ENCOUNTER — NON-APPOINTMENT (OUTPATIENT)
Age: 69
End: 2024-02-19

## 2024-03-28 ENCOUNTER — OFFICE (OUTPATIENT)
Dept: URBAN - METROPOLITAN AREA CLINIC 100 | Facility: CLINIC | Age: 69
Setting detail: OPHTHALMOLOGY
End: 2024-03-28
Payer: COMMERCIAL

## 2024-03-28 PROCEDURE — 99024 POSTOP FOLLOW-UP VISIT: CPT | Performed by: OPHTHALMOLOGY

## 2024-04-26 ENCOUNTER — RX RENEWAL (OUTPATIENT)
Age: 69
End: 2024-04-26

## 2024-04-26 RX ORDER — CYCLOBENZAPRINE HYDROCHLORIDE 5 MG/1
5 TABLET, FILM COATED ORAL
Qty: 180 | Refills: 3 | Status: ACTIVE | COMMUNITY
Start: 2022-04-08 | End: 1900-01-01

## 2024-07-01 ENCOUNTER — OFFICE (OUTPATIENT)
Dept: URBAN - METROPOLITAN AREA CLINIC 100 | Facility: CLINIC | Age: 69
Setting detail: OPHTHALMOLOGY
End: 2024-07-01
Payer: COMMERCIAL

## 2024-07-01 DIAGNOSIS — G51.31: ICD-10-CM

## 2024-07-01 PROCEDURE — 64612 DESTROY NERVE FACE MUSCLE: CPT | Mod: RT | Performed by: OPHTHALMOLOGY

## 2024-07-01 PROCEDURE — 99024 POSTOP FOLLOW-UP VISIT: CPT | Performed by: OPHTHALMOLOGY

## 2024-07-01 ASSESSMENT — CONFRONTATIONAL VISUAL FIELD TEST (CVF)
OD_FINDINGS: FULL
OS_FINDINGS: FULL
OD_FINDINGS: FULL
OS_FINDINGS: FULL

## 2024-08-27 ENCOUNTER — LABORATORY RESULT (OUTPATIENT)
Age: 69
End: 2024-08-27

## 2024-08-27 LAB
ALBUMIN SERPL ELPH-MCNC: 4.6 G/DL
ALP BLD-CCNC: 87 U/L
ALT SERPL-CCNC: 33 U/L
ANION GAP SERPL CALC-SCNC: 12 MMOL/L
AST SERPL-CCNC: 27 U/L
BASOPHILS # BLD AUTO: 0.03 K/UL
BASOPHILS NFR BLD AUTO: 0.5 %
BILIRUB SERPL-MCNC: 0.4 MG/DL
BUN SERPL-MCNC: 23 MG/DL
C3 SERPL-MCNC: 152 MG/DL
C4 SERPL-MCNC: 24 MG/DL
CALCIUM SERPL-MCNC: 10 MG/DL
CHLORIDE SERPL-SCNC: 107 MMOL/L
CO2 SERPL-SCNC: 27 MMOL/L
CREAT SERPL-MCNC: 0.75 MG/DL
CRP SERPL-MCNC: <3 MG/L
DSDNA AB SER-ACNC: 1 IU/ML
EGFR: 86 ML/MIN/1.73M2
EOSINOPHIL # BLD AUTO: 0.16 K/UL
EOSINOPHIL NFR BLD AUTO: 2.6 %
ERYTHROCYTE [SEDIMENTATION RATE] IN BLOOD BY WESTERGREN METHOD: 5 MM/HR
GLUCOSE SERPL-MCNC: 74 MG/DL
HCT VFR BLD CALC: 43.1 %
HGB BLD-MCNC: 13.4 G/DL
IMM GRANULOCYTES NFR BLD AUTO: 0.2 %
LYMPHOCYTES # BLD AUTO: 1.69 K/UL
LYMPHOCYTES NFR BLD AUTO: 27.1 %
MAN DIFF?: NORMAL
MCHC RBC-ENTMCNC: 29.7 PG
MCHC RBC-ENTMCNC: 31.1 GM/DL
MCV RBC AUTO: 95.6 FL
MONOCYTES # BLD AUTO: 0.48 K/UL
MONOCYTES NFR BLD AUTO: 7.7 %
NEUTROPHILS # BLD AUTO: 3.87 K/UL
NEUTROPHILS NFR BLD AUTO: 61.9 %
PLATELET # BLD AUTO: 231 K/UL
POTASSIUM SERPL-SCNC: 5 MMOL/L
PROT SERPL-MCNC: 7.4 G/DL
RBC # BLD: 4.51 M/UL
RBC # FLD: 13.9 %
SODIUM SERPL-SCNC: 146 MMOL/L
WBC # FLD AUTO: 6.24 K/UL

## 2024-08-28 LAB
APPEARANCE: ABNORMAL
BILIRUBIN URINE: NEGATIVE
BLOOD URINE: NEGATIVE
COLOR: YELLOW
GLUCOSE QUALITATIVE U: NEGATIVE MG/DL
KETONES URINE: NEGATIVE MG/DL
LEUKOCYTE ESTERASE URINE: ABNORMAL
NITRITE URINE: NEGATIVE
PH URINE: 5.5
PROTEIN URINE: NEGATIVE MG/DL
SPECIFIC GRAVITY URINE: 1.02
UROBILINOGEN URINE: 0.2 MG/DL

## 2024-08-29 ENCOUNTER — NON-APPOINTMENT (OUTPATIENT)
Age: 69
End: 2024-08-29

## 2024-08-30 ENCOUNTER — APPOINTMENT (OUTPATIENT)
Dept: RHEUMATOLOGY | Facility: CLINIC | Age: 69
End: 2024-08-30
Payer: COMMERCIAL

## 2024-08-30 VITALS
OXYGEN SATURATION: 97 % | HEIGHT: 63 IN | SYSTOLIC BLOOD PRESSURE: 120 MMHG | HEART RATE: 80 BPM | DIASTOLIC BLOOD PRESSURE: 80 MMHG | TEMPERATURE: 97.1 F | WEIGHT: 134 LBS | BODY MASS INDEX: 23.74 KG/M2

## 2024-08-30 DIAGNOSIS — K21.9 GASTRO-ESOPHAGEAL REFLUX DISEASE W/OUT ESOPHAGITIS: ICD-10-CM

## 2024-08-30 DIAGNOSIS — M85.80 OTHER SPECIFIED DISORDERS OF BONE DENSITY AND STRUCTURE, UNSPECIFIED SITE: ICD-10-CM

## 2024-08-30 DIAGNOSIS — M15.9 POLYOSTEOARTHRITIS, UNSPECIFIED: ICD-10-CM

## 2024-08-30 DIAGNOSIS — M32.9 SYSTEMIC LUPUS ERYTHEMATOSUS, UNSPECIFIED: ICD-10-CM

## 2024-08-30 PROCEDURE — 20600 DRAIN/INJ JOINT/BURSA W/O US: CPT | Mod: RT

## 2024-08-30 PROCEDURE — 99214 OFFICE O/P EST MOD 30 MIN: CPT | Mod: 25

## 2024-08-30 RX ORDER — TRIAMCINOLONE ACETONIDE 80 MG/ML
80 INJECTION, SUSPENSION INTRA-ARTICULAR; INTRAMUSCULAR
Qty: 1 | Refills: 0 | Status: COMPLETED | OUTPATIENT
Start: 2024-08-30

## 2024-08-30 RX ORDER — EZETIMIBE 10 MG/1
TABLET ORAL
Refills: 0 | Status: ACTIVE | COMMUNITY

## 2024-08-30 RX ORDER — FAMOTIDINE 40 MG/1
40 TABLET, FILM COATED ORAL
Qty: 90 | Refills: 1 | Status: ACTIVE | COMMUNITY
Start: 2024-08-30 | End: 1900-01-01

## 2024-08-30 RX ADMIN — TRIAMCINOLONE ACETONIDE 0 MG/ML: 80 INJECTION, SUSPENSION INTRA-ARTICULAR; INTRAMUSCULAR at 00:00

## 2024-08-31 NOTE — REVIEW OF SYSTEMS
[Recent Weight Gain (___ Lbs)] : recent [unfilled] ~Ulb weight gain [Eyesight Problems] : eyesight problems [SOB on Exertion] : shortness of breath during exertion [Arthralgias] : arthralgias [As Noted in HPI] : as noted in HPI [Negative] : Heme/Lymph [Fever] : no fever [Chills] : no chills [Feeling Poorly] : not feeling poorly [Feeling Tired] : not feeling tired [Eye Pain] : no eye pain [Red Eyes] : eyes not red [Dry Eyes] : no dryness of the eyes [Loss Of Hearing] : no hearing loss [Chest Pain] : no chest pain [Palpitations] : no palpitations [Shortness Of Breath] : no shortness of breath [Cough] : no cough [Orthopnea] : no orthopnea [PND] : no PND [Abdominal Pain] : no abdominal pain [Heartburn] : no heartburn [Dysuria] : no dysuria [Joint Pain] : no joint pain [Joint Swelling] : no joint swelling [Skin Lesions] : no skin lesions [Joint Stiffness] : no joint stiffness [Limb Weakness] : no limb weakness [Difficulty Walking] : no difficulty walking [Sleep Disturbances] : no sleep disturbances [Anxiety] : no anxiety [Depression] : no depression [Muscle Weakness] : no muscle weakness [Easy Bruising] : no tendency for easy bruising [FreeTextEntry2] : Frustrated w/ inability to lose wt  [FreeTextEntry3] : L eye blurry vision 2/2 ocular melanoma sx  [FreeTextEntry6] :  mild slowly improving follows w/ Dr Chen, PFTs nl 2021, not repeated since COVID  [FreeTextEntry9] : nothing recent except hands - mild CMC and R 5 PIP, hands swell when walking  [de-identified] : hair loss but no patches, no receding hair line

## 2024-08-31 NOTE — HISTORY OF PRESENT ILLNESS
[FreeTextEntry1] : 8/30/24  Complains of intense pain in right pinky finger joint, painful when moved, wants a steroid injection - R5 PIP injected a year ago, had > 6months of relief Hands and feet feel swollen recurrently- and worse at end of day... associated w/ stiffness.  Also increased LBP more at the end of day  Long covid has improved- minimal issues with chronic fatigue, no dyspnea  Complains of sniffling and coughing for last six months, may be post-nasal drip with sx of GERD- on PPI for many years pantoprazole. Last EGD was last year, has some GERD,  Takes OTC drugs for it Still not sleeping well, trouble falling/ staying asleep..   Bone density 7/29/22 with Most severe score -2.1FRAX 10.7, 1.9% risk of hip fracture Supplements..  Takes 600mg Calcium and 1,000mg K2D3 every day No fractures No consequences of ocular melanoma  1) SLE 2) CAD 3) OP 4) OA primarily hands  5) FM mild   ____________________________________________________________________________ f/u visit 65 year old female well known to me for many years. She has a diagnosis of SLE, osteopenia and hand OA  as far as her lupus is concerned she was diagnosed prior to seeing me, she has had minor symptoms mostly with positive serologies and negative APL antibodies. She has been doing well and her SLE has been quiescent for the past few years and I have been able to wean her down on the plaquenil from 500 mg to now off. She has done  very well without PLq. She denies alopecia, oral ulcers, sicca symptoms, or Raynauds. Her joint pains have been stable, she rates it at a 1/10, with some fatigue. She continues to work. She has osteopenia and has been reclast q 2 years.   she had a CT chest that as per pt showed spots of mucus in her lungs. she was given a ? incentive spirometer by pul, states that she had no change for 2 months. She also saw allergy, and was treated with abx and is better now She has a good appetite and denies wt loss. She denies fevers, chills or night sweats. She is uptodate on her age appropriate screens.

## 2024-08-31 NOTE — PHYSICAL EXAM
[General Appearance - Alert] : alert [General Appearance - In No Acute Distress] : in no acute distress [General Appearance - Well Nourished] : well nourished [General Appearance - Well Developed] : well developed [General Appearance - Well-Appearing] : healthy appearing [Sclera] : the sclera and conjunctiva were normal [PERRL With Normal Accommodation] : pupils were equal in size, round, and reactive to light [Extraocular Movements] : extraocular movements were intact [Outer Ear] : the ears and nose were normal in appearance [Nasal Cavity] : the nasal mucosa and septum were normal [Oropharynx] : the oropharynx was normal [Neck Appearance] : the appearance of the neck was normal [Neck Cervical Mass (___cm)] : no neck mass was observed [Jugular Venous Distention Increased] : there was no jugular-venous distention [Respiration, Rhythm And Depth] : normal respiratory rhythm and effort [Exaggerated Use Of Accessory Muscles For Inspiration] : no accessory muscle use [Auscultation Breath Sounds / Voice Sounds] : lungs were clear to auscultation bilaterally [Heart Rate And Rhythm] : heart rate was normal and rhythm regular [Heart Sounds] : normal S1 and S2 [Heart Sounds Gallop] : no gallops [Murmurs] : no murmurs [Heart Sounds Pericardial Friction Rub] : no pericardial rub [Edema] : there was no peripheral edema [Cervical Lymph Nodes Enlarged Posterior Bilaterally] : posterior cervical [Cervical Lymph Nodes Enlarged Anterior Bilaterally] : anterior cervical [Supraclavicular Lymph Nodes Enlarged Bilaterally] : supraclavicular [No CVA Tenderness] : no ~M costovertebral angle tenderness [No Spinal Tenderness] : no spinal tenderness [Skin Color & Pigmentation] : normal skin color and pigmentation [Skin Turgor] : normal skin turgor [] : no rash [Motor Exam] : the motor exam was normal [Oriented To Time, Place, And Person] : oriented to person, place, and time [Impaired Insight] : insight and judgment were intact [Affect] : the affect was normal [Abnormal Walk] : normal gait [Nail Clubbing] : no clubbing  or cyanosis of the fingernails [Musculoskeletal - Swelling] : no joint swelling seen [Motor Tone] : muscle strength and tone were normal [FreeTextEntry1] : FROM all joints, no tender points noted, hands with OA changes, with Heberdens nodes and CMC squaring- most uncomfortable today at R 5 PIP.. bony change not ttt (but h/o R5 being most painful)

## 2024-08-31 NOTE — PHYSICAL EXAM
[General Appearance - Alert] : alert [General Appearance - In No Acute Distress] : in no acute distress [General Appearance - Well Nourished] : well nourished [General Appearance - Well Developed] : well developed [General Appearance - Well-Appearing] : healthy appearing [Sclera] : the sclera and conjunctiva were normal [PERRL With Normal Accommodation] : pupils were equal in size, round, and reactive to light [Extraocular Movements] : extraocular movements were intact [Outer Ear] : the ears and nose were normal in appearance [Oropharynx] : the oropharynx was normal [Nasal Cavity] : the nasal mucosa and septum were normal [Neck Appearance] : the appearance of the neck was normal [Neck Cervical Mass (___cm)] : no neck mass was observed [Jugular Venous Distention Increased] : there was no jugular-venous distention [Respiration, Rhythm And Depth] : normal respiratory rhythm and effort [Exaggerated Use Of Accessory Muscles For Inspiration] : no accessory muscle use [Auscultation Breath Sounds / Voice Sounds] : lungs were clear to auscultation bilaterally [Heart Rate And Rhythm] : heart rate was normal and rhythm regular [Heart Sounds Gallop] : no gallops [Heart Sounds] : normal S1 and S2 [Murmurs] : no murmurs [Heart Sounds Pericardial Friction Rub] : no pericardial rub [Edema] : there was no peripheral edema [Cervical Lymph Nodes Enlarged Posterior Bilaterally] : posterior cervical [Cervical Lymph Nodes Enlarged Anterior Bilaterally] : anterior cervical [Supraclavicular Lymph Nodes Enlarged Bilaterally] : supraclavicular [No CVA Tenderness] : no ~M costovertebral angle tenderness [No Spinal Tenderness] : no spinal tenderness [Skin Color & Pigmentation] : normal skin color and pigmentation [Skin Turgor] : normal skin turgor [] : no rash [Motor Exam] : the motor exam was normal [Oriented To Time, Place, And Person] : oriented to person, place, and time [Impaired Insight] : insight and judgment were intact [Affect] : the affect was normal [Abnormal Walk] : normal gait [Nail Clubbing] : no clubbing  or cyanosis of the fingernails [Musculoskeletal - Swelling] : no joint swelling seen [Motor Tone] : muscle strength and tone were normal [FreeTextEntry1] : FROM all joints, no tender points noted, hands with OA changes, with Heberdens nodes and CMC squaring- most uncomfortable today at R 5 PIP.. bony change not ttt (but h/o R5 being most painful)

## 2024-08-31 NOTE — REVIEW OF SYSTEMS
[Recent Weight Gain (___ Lbs)] : recent [unfilled] ~Ulb weight gain [Eyesight Problems] : eyesight problems [SOB on Exertion] : shortness of breath during exertion [Arthralgias] : arthralgias [As Noted in HPI] : as noted in HPI [Negative] : Heme/Lymph [Fever] : no fever [Chills] : no chills [Feeling Poorly] : not feeling poorly [Feeling Tired] : not feeling tired [Eye Pain] : no eye pain [Red Eyes] : eyes not red [Dry Eyes] : no dryness of the eyes [Loss Of Hearing] : no hearing loss [Chest Pain] : no chest pain [Palpitations] : no palpitations [Shortness Of Breath] : no shortness of breath [Cough] : no cough [Orthopnea] : no orthopnea [PND] : no PND [Abdominal Pain] : no abdominal pain [Heartburn] : no heartburn [Dysuria] : no dysuria [Joint Pain] : no joint pain [Joint Swelling] : no joint swelling [Skin Lesions] : no skin lesions [Joint Stiffness] : no joint stiffness [Limb Weakness] : no limb weakness [Difficulty Walking] : no difficulty walking [Sleep Disturbances] : no sleep disturbances [Anxiety] : no anxiety [Depression] : no depression [Muscle Weakness] : no muscle weakness [Easy Bruising] : no tendency for easy bruising [FreeTextEntry2] : Frustrated w/ inability to lose wt  [FreeTextEntry3] : L eye blurry vision 2/2 ocular melanoma sx  [FreeTextEntry6] :  mild slowly improving follows w/ Dr Chen, PFTs nl 2021, not repeated since COVID  [FreeTextEntry9] : nothing recent except hands - mild CMC and R 5 PIP, hands swell when walking  [de-identified] : hair loss but no patches, no receding hair line

## 2024-08-31 NOTE — ASSESSMENT
[FreeTextEntry1] : 68 year old female She has a diagnosis of SLE, CAD s/p 4 STENTs, hx bronchiectasis (resolved), New Ocular Melanoma s/p tumor resection at [facility] [place] (in remission with close follow up) osteopenia and hand OA presents for routine management today.   R eye spasticity + response to BOtox routinely    1) SLE- PAMELLA 1:160S serologies neg past few ys (? full profile unknown).. w/ nl C3/4 and no proteinuria even off tx.  Minimal systemic c/o now or in past few ys.. tapered off  HCQ from high of 500 mg HCQ completely off 2019... no return of sx.  updated serologies 9/21 neg-> repeat 3/23, 8/24  Previous WILKINS w/u + CAD (see below) and mild bronchiectasis  Her review of systems continues to be negative  still today    2) Muscle spasm/ secondary FMS-PRN uses Tizanidine when severe, nothing recent - trial of amitrip 10-20 mg with + response (though last year felt it wasn't effective)- now  cyclobenzaprine w/ + response now only used PRN.. ,    3) Osteoporosis:  most recent Bone density 7/29/22 with Most severe score -2.1FRAX 10.7, 1.9% risk of hip fracture No fractures, no recent treatment.  Last Reclast 5/11/18.. associated each infusion with several days of diffuse arthralgias/ myalgias- bony pain.    C/w Supplements.. 600mg Calcium and 1,000mg K2D3 every day Previous DExa 7/23/20 w/ most severe T score -2.7 at L1 and -2.4 at LFN w/ frax Exercises routinely, very active.  Risks:  age, and chronic use of PPI...  Tx considerations:  no renal calculi, no systemic radiation (local opth),   4) Multisite:  most severe  Hand OA- She has underlying OA and has had steroid injections in her 5th PIP in the past on the right hand and b/l CMC.. not severe at this time.  -Imaging from 4/2022 did not show degenerative changes despite physical exam and hand appearance  5) CAD:  initially presented with SOB/ WILKINS.. dramatic improvement with 5 stents.. again recommend possible low dose HCQ to prevent low grade inflammation associated w/ SLE but at this time little evidence of SLE for several ys off therapy. Unclear if SLE was every + and  cardiologist did not feel this was necessary .  -Stable today and denies worsening SOB, CP, or palpitations. Follows q6m with cardiology - unchanged since last visit  6) Chronic intermittent Cough- non productive and not associated w/ SOB.. -Ct chest reviewed, ANCAs negative with allergy, follows closely with Dr. Chen- dx bronchiectasis.  No tx needed at this time, has not had full PFTs since COVID- but spirometry was fine.  Progressive improvement-    7) Ocular Melanoma (dx 12/2022) s/p Tumor resection at [facility] [place] (1/2023)/ local radiation ++ response. Minimal vision loss  -Doing well overall, no evidence of metastatic spread and has routine brain MR q6m followed by [facility]  8) Long covid vs metabolic derangement vs sarcopenia - minimal issues now.  Dx with COVID in November 2023, mild case but since then has gained weight, worsening fatigue, and some hair loss- all improved  -7lb weight gain despite calorie restriction and staying active-> better now   Plan 8/30/24: - Switch from pantoprazole to famotadine   - for sleep:  100-400mg melatonin, 200-500mg magnesium, 50mg B6 to help with sleep  - continue to screen for SLE and .re consider 200 mg qod HCQ for CV protection but not necessary at this point.. , but doesn't sound like SLE was ever severe- ? not sure why 500 mg HCQ used??- unclear  - Need updated Dexa results, will treat as appropriate...  If we use Reclast would premed w/ steroids night before and night of.. only had 1 dose thus far  - Continue cyclobenzaprine PRN   - She is aware to call if her symptoms worsen. f/u in 6-12 m

## 2024-08-31 NOTE — ASSESSMENT
[FreeTextEntry1] : 68 year old female She has a diagnosis of SLE, CAD s/p 4 STENTs, hx bronchiectasis (resolved), New Ocular Melanoma s/p tumor resection at [facility] [place] (in remission with close follow up) osteopenia and hand OA presents for routine management today.   R eye spasticity + response to BOtox routinely    1) SLE- PAMELLA 1:160S serologies neg past few ys (? full profile unknown).. w/ nl C3/4 and no proteinuria even off tx.  Minimal systemic c/o now or in past few ys.. tapered off  HCQ from high of 500 mg HCQ completely off 2019... no return of sx.  updated serologies 9/21 neg-> repeat 3/23, 8/24  Previous WILKINS w/u + CAD (see below) and mild bronchiectasis  Her review of systems continues to be negative  still today    2) Muscle spasm/ secondary FMS-PRN uses Tizanidine when severe, nothing recent - trial of amitrip 10-20 mg with + response (though last year felt it wasn't effective)- now  cyclobenzaprine w/ + response now only used PRN.. ,    3) Osteoporosis:  most recent Bone density 7/29/22 with Most severe score -2.1FRAX 10.7, 1.9% risk of hip fracture No fractures, no recent treatment.  Last Reclast 5/11/18.. associated each infusion with several days of diffuse arthralgias/ myalgias- bony pain.    C/w Supplements.. 600mg Calcium and 1,000mg K2D3 every day Previous DExa 7/23/20 w/ most severe T score -2.7 at L1 and -2.4 at LFN w/ frax Exercises routinely, very active.  Risks:  age, and chronic use of PPI...  Tx considerations:  no renal calculi, no systemic radiation (local opth),   4) Multisite:  most severe  Hand OA- She has underlying OA and has had steroid injections in her 5th PIP in the past on the right hand and b/l CMC.. not severe at this time.  -Imaging from 4/2022 did not show degenerative changes despite physical exam and hand appearance  5) CAD:  initially presented with SOB/ WILKINS.. dramatic improvement with 5 stents.. again recommend possible low dose HCQ to prevent low grade inflammation associated w/ SLE but at this time little evidence of SLE for several ys off therapy. Unclear if SLE was every + and  cardiologist did not feel this was necessary .  -Stable today and denies worsening SOB, CP, or palpitations. Follows q6m with cardiology - unchanged since last visit  6) Chronic intermittent Cough- non productive and not associated w/ SOB.. -Ct chest reviewed, ANCAs negative with allergy, follows closely with Dr. Chen- dx bronchiectasis.  No tx needed at this time, has not had full PFTs since COVID- but spirometry was fine.  Progressive improvement-    7) Ocular Melanoma (dx 12/2022) s/p Tumor resection at [facility] [place] (1/2023)/ local radiation ++ response. Minimal vision loss  -Doing well overall, no evidence of metastatic spread and has routine brain MR q6m followed by [facility]  8) Long covid vs metabolic derangement vs sarcopenia - minimal issues now.  Dx with COVID in November 2023, mild case but since then has gained weight, worsening fatigue, and some hair loss- all improved  -7lb weight gain despite calorie restriction and staying active-> better now   Plan 8/30/24: - Switch from pantoprazole to famotadine   - for sleep:  100-400mg melatonin, 200-500mg magnesium, 50mg B6 to help with sleep  - continue to screen for SLE and .re consider 200 mg qod HCQ for CV protection but not necessary at this point.. , but doesn't sound like SLE was ever severe- ? not sure why 500 mg HCQ used??- unclear  - Need updated Dexa results, will treat as appropriate...  If we use Reclast would premed w/ steroids night before and night of.. only had 1 dose thus far  - Continue cyclobenzaprine PRN   - She is aware to call if her symptoms worsen. f/u in 6-12 m

## 2024-08-31 NOTE — PROCEDURE
[Today's Date:] : Date: [unfilled] [Risks] : risks [Benefits] : benefits [Alternatives] : alternatives [Consent Obtained] : written consent was obtained prior to the procedure and is detailed in the patient's record [Patient] : Prior to the start of the procedure a time out was taken and the identity of the patient was confirmed via name and date of birth with the patient. The correct site and the procedure to be performed were confirmed. The correct side was confirmed if applicable. The availability of the correct equipment was verified [Therapeutic] : therapeutic [Ethyl Chloride] : ethyl chloride [Betadine] : betadine solution [Alcohol] : alcohol [25 gauge 5/8  inch] : A 25 gauge 5/8 inch needle was used [___ml Steriod Preparation] : [unfilled] ml of steriod preparation  [Tolerated Well] : the patient tolerated the procedure well [No Complications] : there were no complications [Instructions Given] : handouts/patient instructions were given to patient [Patient Instructed to Call] : patient was instructed to call if redness at site, a decrease in range of motion or an increase in pain is noted after procedure. [1%] : 1%  [#1 Site: ______] : #1 site identified in the [unfilled] [de-identified] : 27 g 5/8" [de-identified] : 0.5mL used [FreeTextEntry1] : ice routinely 20/20 for next 24 hs and call if pain not improved w/ in 72 or  if worsened joint pain, or signs of infection including fevers, chills, redness at site ensues.  This is a strong steroid.. can cause anxiety, hunger, irritability, trouble sleeping, rarely causes palpitations or chest pain but if present go to ER and then let me know

## 2024-08-31 NOTE — PROCEDURE
[Today's Date:] : Date: [unfilled] [Risks] : risks [Benefits] : benefits [Alternatives] : alternatives [Consent Obtained] : written consent was obtained prior to the procedure and is detailed in the patient's record [Patient] : Prior to the start of the procedure a time out was taken and the identity of the patient was confirmed via name and date of birth with the patient. The correct site and the procedure to be performed were confirmed. The correct side was confirmed if applicable. The availability of the correct equipment was verified [Therapeutic] : therapeutic [Ethyl Chloride] : ethyl chloride [Betadine] : betadine solution [Alcohol] : alcohol [25 gauge 5/8  inch] : A 25 gauge 5/8 inch needle was used [___ml Steriod Preparation] : [unfilled] ml of steriod preparation  [Tolerated Well] : the patient tolerated the procedure well [No Complications] : there were no complications [Instructions Given] : handouts/patient instructions were given to patient [Patient Instructed to Call] : patient was instructed to call if redness at site, a decrease in range of motion or an increase in pain is noted after procedure. [1%] : 1%  [#1 Site: ______] : #1 site identified in the [unfilled] [de-identified] : 27 g 5/8" [de-identified] : 0.5mL used [FreeTextEntry1] : ice routinely 20/20 for next 24 hs and call if pain not improved w/ in 72 or  if worsened joint pain, or signs of infection including fevers, chills, redness at site ensues.  This is a strong steroid.. can cause anxiety, hunger, irritability, trouble sleeping, rarely causes palpitations or chest pain but if present go to ER and then let me know

## 2024-09-05 LAB
25(OH)D3 SERPL-MCNC: 48.9 NG/ML
CALCIUM SERPL-MCNC: 9.8 MG/DL
PARATHYROID HORMONE INTACT: 23 PG/ML

## 2024-09-09 ENCOUNTER — APPOINTMENT (OUTPATIENT)
Dept: PEDIATRIC ALLERGY IMMUNOLOGY | Facility: CLINIC | Age: 69
End: 2024-09-09
Payer: COMMERCIAL

## 2024-09-09 VITALS
DIASTOLIC BLOOD PRESSURE: 68 MMHG | HEART RATE: 96 BPM | TEMPERATURE: 97.6 F | OXYGEN SATURATION: 95 % | SYSTOLIC BLOOD PRESSURE: 128 MMHG

## 2024-09-09 DIAGNOSIS — R05.3 CHRONIC COUGH: ICD-10-CM

## 2024-09-09 DIAGNOSIS — J30.9 ALLERGIC RHINITIS, UNSPECIFIED: ICD-10-CM

## 2024-09-09 DIAGNOSIS — L50.0 ALLERGIC URTICARIA: ICD-10-CM

## 2024-09-09 DIAGNOSIS — Z91.013 ALLERGY TO SEAFOOD: ICD-10-CM

## 2024-09-09 PROCEDURE — 99214 OFFICE O/P EST MOD 30 MIN: CPT

## 2024-09-09 NOTE — ASSESSMENT
[FreeTextEntry1] : Allergic rhinitis (dust mites): Persistent symptoms in spite of using Flonase and azelastine.  Discontinue both.  Trial of Ryaltris (2 samples given), 1 to 2 sprays to each nostril twice daily.  If symptoms persist, ENT exam for rhinoscopy and evaluation of vocal cords.  Referred for blood work for environmental allergies. Cough: Most likely due to postnasal drip.  Spirometry was normal previous visit. History of urticaria and lip swelling after eating sauce with oysters.  History of vomiting with shellfish, now tolerates.  Blood work for IgE to seafood (lobster and oyster, that she is not eating; did not ask for IgE to seafood that she tolerates).  Discussed EpiPen.  Patient did not feel she needed one. History of urticaria with hair products: Referred for serum tryptase.  Caution with new hair products. Decide follow-up after results of blood work received.

## 2024-09-09 NOTE — HISTORY OF PRESENT ILLNESS
[de-identified] : In office for follow-up for cough and allergies.  Evaluated on January 18, 2024 for significant cough and nasal congestion.  It responded to antibiotic and Medrol for 1 week prior to coming to our office.  It was considered that she had a sinus infection and she was treated with Augmentin 875 mg twice daily for 2 weeks.  She was also prescribed azelastine 0.1%, fluticasone nasal spray both 1 spray per nostril twice daily and levalbuterol HFA as needed. She was returning patient to our office.  She had a history of recurrent sinus infections and chronic stuffy nose when she was younger.  She was immunized with Pneumovax 23 on 12/29/2017 and Hib vaccine on 10/30/2020, with good response.  Skin testing to environmental allergens was negative in 2008.  Spirometry showed mild restriction in 2020. Spirometry was normal during first visit on 1/18/2024.  History of SLE treated with immunosuppressive medications in the past, now in remission.  History of cardiac stents, heart ablation, hypertension and acid reflux.  History of ocular melanoma with surgery in January 2023 and radiation. Current medications: Valtrex, metoprolol, baby aspirin, probiotic, Crestor, vitamin K2 D3, calcium, multivitamin, magnesium, vitamin B6, Zetia, famotidine.  Pantoprazole was discontinued by rheumatologist.  Initially prescribed by gastroenterologist. In the past also followed by pulmonary physician for abnormal CT of the chest.  Continues to have sniffles and cough, increased stuffy nose at night and postnasal drip, needs to sleep on 2 pillows.  Still using Flonase and Astelin 1 spray per nostril twice daily.  No other antibiotics for respiratory infections since January.  Gets heartburn after eating in 1 night she had regurgitation of liquid in the middle of the night.  History of significant urticaria and lip swelling several years ago when the product used for hair straightening was changed.  She went to the emergency room.  Subsequently using natural product for hair straightening, with patch on the arm prior to being applied on the hair.  History of vomiting after eating restaurants in previous years.  Evaluated by previous allergist, told that she is allergic to shellfish.  Stopped eating shellfish.  After several years gradually reintroduced shrimp, crab, clam, scallop.  Also tolerating salmon and flounder.  History of vomiting after canned tuna on 1 occasion but continued to tolerate tuna subsequently.  Not eating lobster or oyster.  Several months ago used a store-bought sauce to prepare chicken at home.  Several hours later she had facial erythema, itching and swollen lips that improved with Benadryl.  When she checked the label of the source, it contained oyster extract.

## 2024-09-09 NOTE — REASON FOR VISIT
[Routine Follow-Up] : a routine follow-up visit for [Congestion] : congestion [To Food] : allergy to food [Cough] : cough

## 2024-09-09 NOTE — PHYSICAL EXAM
[Alert] : alert [No Acute Distress] : no acute distress [Normal Voice/Communication] : normal voice communication [Supple] : the neck was supple [Normal S1, S2] : normal S1 and S2 [No murmur] : no murmur [Regular Rhythm] : with a regular rhythm [Normal Cervical Lymph Nodes] : cervical [Skin Intact] : skin intact  [No Rash] : no rash [No clubbing] : no clubbing [No Cyanosis] : no cyanosis [Alert, Awake, Oriented as Age-Appropriate] : alert, awake, oriented as age appropriate [de-identified] : Eyes clear. [de-identified] : Throat mild erythema.  Nasal mucosa red, mild bilateral stuffy nose, scant yellow crusting.  Tympanic membranes dull.  No sinus tenderness. [de-identified] : Chest clear, good air entry, no wheezing or crackles.

## 2024-09-09 NOTE — SOCIAL HISTORY
[de-identified] : House with gas baseboard heating, central air conditioning, 3 dogs, no cigarette smoke exposure, no carpet in bedroom.

## 2024-09-18 ENCOUNTER — NON-APPOINTMENT (OUTPATIENT)
Age: 69
End: 2024-09-18

## 2024-10-14 ENCOUNTER — OFFICE (OUTPATIENT)
Dept: URBAN - METROPOLITAN AREA CLINIC 100 | Facility: CLINIC | Age: 69
Setting detail: OPHTHALMOLOGY
End: 2024-10-14
Payer: COMMERCIAL

## 2024-10-14 DIAGNOSIS — G51.31: ICD-10-CM

## 2024-10-14 PROCEDURE — 99024 POSTOP FOLLOW-UP VISIT: CPT | Performed by: OPHTHALMOLOGY

## 2024-10-14 PROCEDURE — 64612 DESTROY NERVE FACE MUSCLE: CPT | Mod: RT | Performed by: OPHTHALMOLOGY

## 2024-10-14 ASSESSMENT — REFRACTION_AUTOREFRACTION
OS_CYLINDER: -0.25
OS_SPHERE: -0.25
OD_CYLINDER: -0.25
OD_AXIS: 101
OD_SPHERE: 0.00
OS_SPHERE: +2.50
OS_CYLINDER: -0.25
OD_AXIS: 097
OD_AXIS: 097
OS_AXIS: 177
OS_SPHERE: -0.25
OS_SPHERE: +2.50
OD_CYLINDER: -0.25
OD_SPHERE: +0.75
OS_CYLINDER: -0.25
OS_AXIS: 80
OD_CYLINDER: -0.25
OS_AXIS: 80
OD_SPHERE: 0.00
OD_AXIS: 101
OD_SPHERE: +0.75
OS_CYLINDER: -0.25
OD_CYLINDER: -0.25
OS_AXIS: 177

## 2024-10-14 ASSESSMENT — KERATOMETRY
OS_K1POWER_DIOPTERS: 43.25
OS_AXISANGLE_DEGREES: 098
OS_K2POWER_DIOPTERS: 44.75
METHOD_AUTO_MANUAL: MANUAL
OD_K1POWER_DIOPTERS: 44.00
OS_K1POWER_DIOPTERS: 43.25
OD_K1POWER_DIOPTERS: 44.00
OD_AXISANGLE_DEGREES: 099
OD_AXISANGLE_DEGREES: 099
OS_AXISANGLE_DEGREES: 098
OD_K2POWER_DIOPTERS: 45.25
METHOD_AUTO_MANUAL: MANUAL
OS_K2POWER_DIOPTERS: 44.75
OD_K2POWER_DIOPTERS: 45.25

## 2024-10-14 ASSESSMENT — VISUAL ACUITY
OD_BCVA: 20/100
OS_BCVA: 20/25+2
OD_BCVA: 20/100
OS_BCVA: 20/25+2

## 2024-10-14 ASSESSMENT — LID EXAM ASSESSMENTS
OD_BLEPHARITIS: RUL
OS_BLEPHARITIS: LUL

## 2024-10-14 ASSESSMENT — SUPERFICIAL PUNCTATE KERATITIS (SPK)
OD_SPK: T
OS_SPK: T

## 2024-10-14 ASSESSMENT — CONFRONTATIONAL VISUAL FIELD TEST (CVF)
OS_FINDINGS: FULL
OD_FINDINGS: FULL

## 2024-11-14 ENCOUNTER — NON-APPOINTMENT (OUTPATIENT)
Age: 69
End: 2024-11-14

## 2025-01-16 ENCOUNTER — OFFICE (OUTPATIENT)
Dept: URBAN - METROPOLITAN AREA CLINIC 100 | Facility: CLINIC | Age: 70
Setting detail: OPHTHALMOLOGY
End: 2025-01-16
Payer: COMMERCIAL

## 2025-01-16 ENCOUNTER — RX ONLY (RX ONLY)
Age: 70
End: 2025-01-16

## 2025-01-16 DIAGNOSIS — H00.15: ICD-10-CM

## 2025-01-16 PROCEDURE — 67800 REMOVE EYELID LESION: CPT | Mod: E2 | Performed by: OPHTHALMOLOGY

## 2025-01-16 ASSESSMENT — KERATOMETRY
OS_K2POWER_DIOPTERS: 44.75
OD_K1POWER_DIOPTERS: 44.00
OD_AXISANGLE_DEGREES: 099
OD_K2POWER_DIOPTERS: 45.25
OS_AXISANGLE_DEGREES: 098
METHOD_AUTO_MANUAL: MANUAL
OS_K1POWER_DIOPTERS: 43.25

## 2025-01-16 ASSESSMENT — REFRACTION_AUTOREFRACTION
OS_CYLINDER: -0.25
OD_AXIS: 097
OD_SPHERE: +0.75
OD_CYLINDER: -0.25
OS_SPHERE: +2.50
OD_CYLINDER: -0.25
OS_SPHERE: -0.25
OS_AXIS: 80
OS_AXIS: 177
OS_CYLINDER: -0.25
OD_SPHERE: 0.00
OD_AXIS: 101

## 2025-01-16 ASSESSMENT — VISUAL ACUITY
OS_BCVA: 20/30
OD_BCVA: 20/30

## 2025-01-16 ASSESSMENT — SUPERFICIAL PUNCTATE KERATITIS (SPK)
OS_SPK: T
OD_SPK: T

## 2025-01-16 ASSESSMENT — LID EXAM ASSESSMENTS
OS_BLEPHARITIS: LUL
OD_BLEPHARITIS: RUL

## 2025-01-16 ASSESSMENT — CONFRONTATIONAL VISUAL FIELD TEST (CVF)
OD_FINDINGS: FULL
OS_FINDINGS: FULL

## 2025-01-23 ENCOUNTER — OFFICE (OUTPATIENT)
Dept: URBAN - METROPOLITAN AREA CLINIC 100 | Facility: CLINIC | Age: 70
Setting detail: OPHTHALMOLOGY
End: 2025-01-23
Payer: COMMERCIAL

## 2025-01-23 DIAGNOSIS — G51.31: ICD-10-CM

## 2025-01-23 DIAGNOSIS — H00.15: ICD-10-CM

## 2025-01-23 PROCEDURE — 92012 INTRM OPH EXAM EST PATIENT: CPT | Mod: 25 | Performed by: OPHTHALMOLOGY

## 2025-01-23 PROCEDURE — 64612 DESTROY NERVE FACE MUSCLE: CPT | Mod: RT | Performed by: OPHTHALMOLOGY

## 2025-01-23 PROCEDURE — 99024 POSTOP FOLLOW-UP VISIT: CPT | Performed by: OPHTHALMOLOGY

## 2025-01-23 ASSESSMENT — REFRACTION_AUTOREFRACTION
OS_SPHERE: +2.50
OS_AXIS: 80
OD_AXIS: 097
OS_SPHERE: -0.25
OS_SPHERE: -0.25
OD_SPHERE: 0.00
OD_AXIS: 101
OD_CYLINDER: -0.25
OD_AXIS: 101
OD_CYLINDER: -0.25
OS_CYLINDER: -0.25
OS_AXIS: 80
OD_AXIS: 097
OD_SPHERE: 0.00
OS_CYLINDER: -0.25
OD_CYLINDER: -0.25
OD_SPHERE: +0.75
OD_SPHERE: +0.75
OD_CYLINDER: -0.25
OS_AXIS: 177
OS_AXIS: 177
OS_SPHERE: +2.50

## 2025-01-23 ASSESSMENT — KERATOMETRY
OD_K2POWER_DIOPTERS: 45.25
OD_AXISANGLE_DEGREES: 099
OS_K2POWER_DIOPTERS: 44.75
METHOD_AUTO_MANUAL: MANUAL
OS_AXISANGLE_DEGREES: 098
OS_K1POWER_DIOPTERS: 43.25
OD_K1POWER_DIOPTERS: 44.00
OS_K2POWER_DIOPTERS: 44.75
OS_K1POWER_DIOPTERS: 43.25
OD_AXISANGLE_DEGREES: 099
METHOD_AUTO_MANUAL: MANUAL
OD_K2POWER_DIOPTERS: 45.25
OS_AXISANGLE_DEGREES: 098
OD_K1POWER_DIOPTERS: 44.00

## 2025-01-23 ASSESSMENT — VISUAL ACUITY
OD_BCVA: 20/30
OD_BCVA: 20/30
OS_BCVA: 20/30
OS_BCVA: 20/30

## 2025-01-23 ASSESSMENT — LID EXAM ASSESSMENTS
OD_BLEPHARITIS: RUL
OS_BLEPHARITIS: LUL

## 2025-01-23 ASSESSMENT — SUPERFICIAL PUNCTATE KERATITIS (SPK)
OD_SPK: T
OS_SPK: T

## 2025-01-23 ASSESSMENT — CONFRONTATIONAL VISUAL FIELD TEST (CVF)
OS_FINDINGS: FULL
OD_FINDINGS: FULL

## 2025-05-12 ENCOUNTER — OFFICE (OUTPATIENT)
Dept: URBAN - METROPOLITAN AREA CLINIC 100 | Facility: CLINIC | Age: 70
Setting detail: OPHTHALMOLOGY
End: 2025-05-12
Payer: MEDICARE

## 2025-05-12 PROCEDURE — 99024 POSTOP FOLLOW-UP VISIT: CPT | Performed by: OPHTHALMOLOGY

## 2025-05-12 ASSESSMENT — KERATOMETRY
OS_K2POWER_DIOPTERS: 44.75
OD_K2POWER_DIOPTERS: 45.25
OS_AXISANGLE_DEGREES: 098
OS_K1POWER_DIOPTERS: 43.25
OD_AXISANGLE_DEGREES: 099
OD_K1POWER_DIOPTERS: 44.00
METHOD_AUTO_MANUAL: MANUAL

## 2025-05-12 ASSESSMENT — REFRACTION_AUTOREFRACTION
OD_SPHERE: +0.75
OD_AXIS: 101
OS_CYLINDER: -0.25
OD_SPHERE: 0.00
OS_SPHERE: +2.50
OD_CYLINDER: -0.25
OS_AXIS: 80
OS_CYLINDER: -0.25
OD_AXIS: 097
OS_SPHERE: -0.25
OD_CYLINDER: -0.25
OS_AXIS: 177

## 2025-05-12 ASSESSMENT — VISUAL ACUITY
OD_BCVA: 20/30
OS_BCVA: 20/30

## 2025-07-09 ENCOUNTER — NON-APPOINTMENT (OUTPATIENT)
Age: 70
End: 2025-07-09

## 2025-08-08 ENCOUNTER — NON-APPOINTMENT (OUTPATIENT)
Age: 70
End: 2025-08-08

## 2025-08-29 ENCOUNTER — APPOINTMENT (OUTPATIENT)
Dept: RHEUMATOLOGY | Facility: CLINIC | Age: 70
End: 2025-08-29

## 2025-08-29 ENCOUNTER — NON-APPOINTMENT (OUTPATIENT)
Age: 70
End: 2025-08-29

## 2025-08-29 VITALS
WEIGHT: 134 LBS | HEART RATE: 77 BPM | TEMPERATURE: 97.7 F | SYSTOLIC BLOOD PRESSURE: 110 MMHG | DIASTOLIC BLOOD PRESSURE: 68 MMHG | BODY MASS INDEX: 23.74 KG/M2 | HEIGHT: 63 IN | OXYGEN SATURATION: 97 %

## 2025-08-29 DIAGNOSIS — R74.8 ABNORMAL LEVELS OF OTHER SERUM ENZYMES: ICD-10-CM

## 2025-08-29 PROCEDURE — 99214 OFFICE O/P EST MOD 30 MIN: CPT | Mod: 25

## 2025-08-29 PROCEDURE — 20600 DRAIN/INJ JOINT/BURSA W/O US: CPT | Mod: RT

## 2025-08-29 RX ORDER — LOSARTAN POTASSIUM 50 MG/1
50 TABLET, FILM COATED ORAL
Refills: 0 | Status: ACTIVE | COMMUNITY

## 2025-08-29 RX ORDER — CLOPIDOGREL 75 MG/1
TABLET, FILM COATED ORAL
Refills: 0 | Status: ACTIVE | COMMUNITY

## 2025-08-29 RX ORDER — TRIAMCINOLONE ACETONIDE 80 MG/ML
80 INJECTION, SUSPENSION INTRA-ARTICULAR; INTRAMUSCULAR
Qty: 1 | Refills: 0 | Status: COMPLETED | OUTPATIENT
Start: 2025-08-29

## 2025-08-29 RX ADMIN — TRIAMCINOLONE ACETONIDE 0 MG/ML: 80 INJECTION, SUSPENSION INTRA-ARTICULAR; INTRAMUSCULAR at 00:00
